# Patient Record
Sex: MALE | Race: WHITE | NOT HISPANIC OR LATINO | URBAN - METROPOLITAN AREA
[De-identification: names, ages, dates, MRNs, and addresses within clinical notes are randomized per-mention and may not be internally consistent; named-entity substitution may affect disease eponyms.]

---

## 2017-01-02 ENCOUNTER — OUTPATIENT (OUTPATIENT)
Dept: OUTPATIENT SERVICES | Facility: HOSPITAL | Age: 42
LOS: 1 days | Discharge: HOME | End: 2017-01-02

## 2017-06-27 DIAGNOSIS — I25.10 ATHEROSCLEROTIC HEART DISEASE OF NATIVE CORONARY ARTERY WITHOUT ANGINA PECTORIS: ICD-10-CM

## 2017-06-27 DIAGNOSIS — Z95.5 PRESENCE OF CORONARY ANGIOPLASTY IMPLANT AND GRAFT: ICD-10-CM

## 2017-06-27 DIAGNOSIS — I10 ESSENTIAL (PRIMARY) HYPERTENSION: ICD-10-CM

## 2017-06-27 DIAGNOSIS — E78.5 HYPERLIPIDEMIA, UNSPECIFIED: ICD-10-CM

## 2017-12-23 ENCOUNTER — OUTPATIENT (OUTPATIENT)
Dept: OUTPATIENT SERVICES | Facility: HOSPITAL | Age: 42
LOS: 1 days | Discharge: HOME | End: 2017-12-23

## 2017-12-23 DIAGNOSIS — I10 ESSENTIAL (PRIMARY) HYPERTENSION: ICD-10-CM

## 2017-12-23 DIAGNOSIS — R07.9 CHEST PAIN, UNSPECIFIED: ICD-10-CM

## 2017-12-23 DIAGNOSIS — E78.5 HYPERLIPIDEMIA, UNSPECIFIED: ICD-10-CM

## 2017-12-23 DIAGNOSIS — Z95.5 PRESENCE OF CORONARY ANGIOPLASTY IMPLANT AND GRAFT: ICD-10-CM

## 2017-12-23 DIAGNOSIS — I25.10 ATHEROSCLEROTIC HEART DISEASE OF NATIVE CORONARY ARTERY WITHOUT ANGINA PECTORIS: ICD-10-CM

## 2018-11-07 ENCOUNTER — INPATIENT (INPATIENT)
Facility: HOSPITAL | Age: 43
LOS: 1 days | Discharge: HOME | End: 2018-11-09
Attending: INTERNAL MEDICINE | Admitting: INTERNAL MEDICINE

## 2018-11-07 VITALS
HEART RATE: 68 BPM | DIASTOLIC BLOOD PRESSURE: 94 MMHG | OXYGEN SATURATION: 99 % | SYSTOLIC BLOOD PRESSURE: 160 MMHG | WEIGHT: 189.6 LBS | RESPIRATION RATE: 16 BRPM | TEMPERATURE: 98 F

## 2018-11-07 LAB
ALBUMIN SERPL ELPH-MCNC: 4.4 G/DL — SIGNIFICANT CHANGE UP (ref 3.5–5.2)
ALP SERPL-CCNC: 90 U/L — SIGNIFICANT CHANGE UP (ref 30–115)
ALT FLD-CCNC: 22 U/L — SIGNIFICANT CHANGE UP (ref 0–41)
ANION GAP SERPL CALC-SCNC: 15 MMOL/L — HIGH (ref 7–14)
APTT BLD: 30.2 SEC — SIGNIFICANT CHANGE UP (ref 27–39.2)
AST SERPL-CCNC: 30 U/L — SIGNIFICANT CHANGE UP (ref 0–41)
BASOPHILS # BLD AUTO: 0.07 K/UL — SIGNIFICANT CHANGE UP (ref 0–0.2)
BASOPHILS NFR BLD AUTO: 0.7 % — SIGNIFICANT CHANGE UP (ref 0–1)
BILIRUB DIRECT SERPL-MCNC: <0.2 MG/DL — SIGNIFICANT CHANGE UP (ref 0–0.2)
BILIRUB INDIRECT FLD-MCNC: >0.2 MG/DL — SIGNIFICANT CHANGE UP (ref 0.2–1.2)
BILIRUB SERPL-MCNC: 0.4 MG/DL — SIGNIFICANT CHANGE UP (ref 0.2–1.2)
BUN SERPL-MCNC: 20 MG/DL — SIGNIFICANT CHANGE UP (ref 10–20)
CALCIUM SERPL-MCNC: 9.8 MG/DL — SIGNIFICANT CHANGE UP (ref 8.5–10.1)
CHLORIDE SERPL-SCNC: 99 MMOL/L — SIGNIFICANT CHANGE UP (ref 98–110)
CO2 SERPL-SCNC: 25 MMOL/L — SIGNIFICANT CHANGE UP (ref 17–32)
CREAT SERPL-MCNC: 1.3 MG/DL — SIGNIFICANT CHANGE UP (ref 0.7–1.5)
EOSINOPHIL # BLD AUTO: 0.26 K/UL — SIGNIFICANT CHANGE UP (ref 0–0.7)
EOSINOPHIL NFR BLD AUTO: 2.5 % — SIGNIFICANT CHANGE UP (ref 0–8)
GLUCOSE BLDC GLUCOMTR-MCNC: 82 MG/DL — SIGNIFICANT CHANGE UP (ref 70–99)
GLUCOSE SERPL-MCNC: 113 MG/DL — HIGH (ref 70–99)
HCT VFR BLD CALC: 45 % — SIGNIFICANT CHANGE UP (ref 42–52)
HGB BLD-MCNC: 14.9 G/DL — SIGNIFICANT CHANGE UP (ref 14–18)
IMM GRANULOCYTES NFR BLD AUTO: 0.3 % — SIGNIFICANT CHANGE UP (ref 0.1–0.3)
INR BLD: 1.03 RATIO — SIGNIFICANT CHANGE UP (ref 0.65–1.3)
LACTATE SERPL-SCNC: 1.4 MMOL/L — SIGNIFICANT CHANGE UP (ref 0.5–2.2)
LIDOCAIN IGE QN: 22 U/L — SIGNIFICANT CHANGE UP (ref 7–60)
LYMPHOCYTES # BLD AUTO: 2.18 K/UL — SIGNIFICANT CHANGE UP (ref 1.2–3.4)
LYMPHOCYTES # BLD AUTO: 20.9 % — SIGNIFICANT CHANGE UP (ref 20.5–51.1)
MCHC RBC-ENTMCNC: 26.8 PG — LOW (ref 27–31)
MCHC RBC-ENTMCNC: 33.1 G/DL — SIGNIFICANT CHANGE UP (ref 32–37)
MCV RBC AUTO: 81.1 FL — SIGNIFICANT CHANGE UP (ref 80–94)
MONOCYTES # BLD AUTO: 0.7 K/UL — HIGH (ref 0.1–0.6)
MONOCYTES NFR BLD AUTO: 6.7 % — SIGNIFICANT CHANGE UP (ref 1.7–9.3)
NEUTROPHILS # BLD AUTO: 7.21 K/UL — HIGH (ref 1.4–6.5)
NEUTROPHILS NFR BLD AUTO: 68.9 % — SIGNIFICANT CHANGE UP (ref 42.2–75.2)
NRBC # BLD: 0 /100 WBCS — SIGNIFICANT CHANGE UP (ref 0–0)
NT-PROBNP SERPL-SCNC: 114 PG/ML — SIGNIFICANT CHANGE UP (ref 0–300)
PLATELET # BLD AUTO: 333 K/UL — SIGNIFICANT CHANGE UP (ref 130–400)
POTASSIUM SERPL-MCNC: 4.6 MMOL/L — SIGNIFICANT CHANGE UP (ref 3.5–5)
POTASSIUM SERPL-SCNC: 4.6 MMOL/L — SIGNIFICANT CHANGE UP (ref 3.5–5)
PROT SERPL-MCNC: 7.9 G/DL — SIGNIFICANT CHANGE UP (ref 6–8)
PROTHROM AB SERPL-ACNC: 11.8 SEC — SIGNIFICANT CHANGE UP (ref 9.95–12.87)
RBC # BLD: 5.55 M/UL — SIGNIFICANT CHANGE UP (ref 4.7–6.1)
RBC # FLD: 13.2 % — SIGNIFICANT CHANGE UP (ref 11.5–14.5)
SODIUM SERPL-SCNC: 139 MMOL/L — SIGNIFICANT CHANGE UP (ref 135–146)
TROPONIN T SERPL-MCNC: 0.1 NG/ML — CRITICAL HIGH
WBC # BLD: 10.45 K/UL — SIGNIFICANT CHANGE UP (ref 4.8–10.8)
WBC # FLD AUTO: 10.45 K/UL — SIGNIFICANT CHANGE UP (ref 4.8–10.8)

## 2018-11-07 RX ORDER — HEPARIN SODIUM 5000 [USP'U]/ML
INJECTION INTRAVENOUS; SUBCUTANEOUS
Qty: 25000 | Refills: 0 | Status: DISCONTINUED | OUTPATIENT
Start: 2018-11-07 | End: 2018-11-08

## 2018-11-07 RX ORDER — ASPIRIN/CALCIUM CARB/MAGNESIUM 324 MG
325 TABLET ORAL ONCE
Qty: 0 | Refills: 0 | Status: COMPLETED | OUTPATIENT
Start: 2018-11-07 | End: 2018-11-07

## 2018-11-07 RX ORDER — HEPARIN SODIUM 5000 [USP'U]/ML
5000 INJECTION INTRAVENOUS; SUBCUTANEOUS ONCE
Qty: 0 | Refills: 0 | Status: COMPLETED | OUTPATIENT
Start: 2018-11-07 | End: 2018-11-07

## 2018-11-07 RX ORDER — CLOPIDOGREL BISULFATE 75 MG/1
300 TABLET, FILM COATED ORAL ONCE
Qty: 0 | Refills: 0 | Status: COMPLETED | OUTPATIENT
Start: 2018-11-07 | End: 2018-11-07

## 2018-11-07 RX ADMIN — HEPARIN SODIUM 1000 UNIT(S)/HR: 5000 INJECTION INTRAVENOUS; SUBCUTANEOUS at 23:03

## 2018-11-07 RX ADMIN — CLOPIDOGREL BISULFATE 300 MILLIGRAM(S): 75 TABLET, FILM COATED ORAL at 21:40

## 2018-11-07 RX ADMIN — Medication 325 MILLIGRAM(S): at 19:47

## 2018-11-07 RX ADMIN — HEPARIN SODIUM 5000 UNIT(S): 5000 INJECTION INTRAVENOUS; SUBCUTANEOUS at 23:03

## 2018-11-07 NOTE — ED PROVIDER NOTE - ATTENDING CONTRIBUTION TO CARE
44 y/o male with h/o htn, hld, cad s/p RCA stent 7/2016, in ER with c/o CP. pt states had episode of CP last night, pressure to center of chest, lasted ~ 2-3 hours and then resolved.  TOday ~ 2 pm pain returned, felt much worse, pt became diaphoretic and nauseated, no vomiting, radiating into L arm.  Pt states pain was very bad for ~ 30 minutes and then started easing up.  mild pain currently.  no ha/dizziness/loc.  no palpitations.  no abd pain. no back pain.  no le pain/swelling. follows with [facility]anes[facility] for card, states had exercise stress test last yr that was 'ok'.    pe - nad, nc/at, eomi, perrl, op - clear, cta b/l, no w/r/r, rrr, abd- soft, nt/nd, nabs, from x 4, A&O x 3, no focal neuro deficits.  -cardiac w/u, to admit.

## 2018-11-07 NOTE — CONSULT NOTE ADULT - SUBJECTIVE AND OBJECTIVE BOX
Chief complaint:  Chest pain    HPI:  42 yo M h/o CAD s/p PCI to RCA/LAD 7/2016 came to ER c/o severe substernal burning chest pain yesterday and one epsiode today both lasting more then 1 hours, associated with nausea, diaphoresis, pain was 10/10 intensity this afternoon subsided but still there 2-3/10 intensity, on arrival to ER Vital stable, trop 0.1, EKG non speicifc T wave change.      ROS:  Constitutional: No fever, chill, sweats  Eye: No recent visual problem  ENMT: No ear pain, nasal congestion, throat pain  Respiratoty: No SOB, cough  Cardiovascular: + chest pain, palpitaion, syncope  Gastrointestinal: No nausea, vomitting, diarhea  Genitourinary: No dysuria, hematuria  Heam/Lymp: No brusing tendency, no swollen glands  Endocrine: Negative for excessive hunger, thirst  Musculoskeletal: No neck pain, back pain, joint pain  Intergumentory: No rash, skin lesions  Neurologic: alert and oriented    PAST MEDICAL & SURGICAL HISTORY  CAD  HTN    FAMILY HISTORY:  FAMILY HISTORY:  HTN mother    SOCIAL HISTORY:  Denies smoking, alcohol    ALLERGIES:  No Known Allergies    MEDICATIONS:  MEDICATIONS  (STANDING):  heparin  Infusion.  Unit(s)/Hr (10 mL/Hr) IV Continuous <Continuous>  heparin  Injectable 5000 Unit(s) IV Push once    MEDICATIONS  (PRN):      HOME MEDICATIONS:  Home Medications:      VITALS:   T(F): 97.9 (11-07 @ 16:46), Max: 97.9 (11-07 @ 16:46)  HR: 60 (11-07 @ 21:41) (60 - 68)  BP: 134/88 (11-07 @ 21:41) (134/88 - 160/94)  BP(mean): --  RR: 16 (11-07 @ 16:46) (16 - 16)  SpO2: 99% (11-07 @ 16:46) (99% - 99%)    I&O's Summary      PHYSICAL EXAM:  GEN: Alert and oriented X 3, Well nourished, No acute distress  NECK: Supple, non tender, NO JVD, No carotid bruit,   LUNGS: Clear to auscultation bilaterally, non labored respiration  CARDIOVASCULAR: S1/S2 present, RRR , no murmus or rubs, + PP bilaterally  ABD: Soft, non-tender, non-distended,   EXT: No Lower extremity edema, no tenderness  NEURO: Non focal  SKIN: Intact    LABS:                        14.9   10.45 )-----------( 333      ( 07 Nov 2018 19:00 )             45.0     11-07    139  |  99  |  20  ----------------------------<  113<H>  4.6   |  25  |  1.3    Ca    9.8      07 Nov 2018 19:00    TPro  7.9  /  Alb  4.4  /  TBili  0.4  /  DBili  <0.2  /  AST  30  /  ALT  22  /  AlkPhos  90  11-07    PTT - ( 07 Nov 2018 19:00 )  PTT:30.2 sec  Troponin T, Serum: 0.10 ng/mL <HH> (11-07-18 @ 19:00)  Lactate, Blood: 1.4 mmol/L (11-07-18 @ 19:00)    CARDIAC MARKERS ( 07 Nov 2018 19:00 )  x     / 0.10 ng/mL / x     / x     / x        Serum Pro-Brain Natriuretic Peptide: 114 pg/mL (11-07-18 @ 19:00)      RADIOLOGY:  -CXR:      -TTE:  -CCTA:  -STRESS TEST:  -CATHETERIZATION: PCI to Prox and diatl RCA, Prox LAD 7/2016    ECG:  NSR, t wave inversion v4-v6    TELEMETRY EVENTS: Chief complaint:  Chest pain    HPI:  42 yo M h/o CAD s/p PCI to RCA/LAD 7/2016 came to ER c/o severe substernal burning chest pain yesterday and one epsiode today both lasting more then 1 hours, associated with nausea, diaphoresis, pain was 10/10 intensity this afternoon subsided but still there 2-3/10 intensity, on arrival to ER Vital stable, trop 0.1, EKG non speicifc T wave change.      ROS:  Constitutional: No fever, chill, sweats  Eye: No recent visual problem  ENMT: No ear pain, nasal congestion, throat pain  Respiratoty: No SOB, cough  Cardiovascular: + chest pain, palpitaion, syncope  Gastrointestinal: No nausea, vomitting, diarhea  Genitourinary: No dysuria, hematuria  Heam/Lymp: No brusing tendency, no swollen glands  Endocrine: Negative for excessive hunger, thirst  Musculoskeletal: No neck pain, back pain, joint pain  Intergumentory: No rash, skin lesions  Neurologic: alert and oriented    PAST MEDICAL & SURGICAL HISTORY  CAD  HTN    FAMILY HISTORY:  FAMILY HISTORY:  HTN mother    SOCIAL HISTORY:  Denies smoking, alcohol    ALLERGIES:  No Known Allergies    MEDICATIONS:  MEDICATIONS  (STANDING):  heparin  Infusion.  Unit(s)/Hr (10 mL/Hr) IV Continuous <Continuous>  heparin  Injectable 5000 Unit(s) IV Push once    MEDICATIONS  (PRN):      HOME MEDICATIONS:  Home Medications:      VITALS:   T(F): 97.9 (11-07 @ 16:46), Max: 97.9 (11-07 @ 16:46)  HR: 60 (11-07 @ 21:41) (60 - 68)  BP: 134/88 (11-07 @ 21:41) (134/88 - 160/94)  BP(mean): --  RR: 16 (11-07 @ 16:46) (16 - 16)  SpO2: 99% (11-07 @ 16:46) (99% - 99%)    I&O's Summary      PHYSICAL EXAM:  GEN: Alert and oriented X 3, Well nourished, No acute distress  NECK: Supple, non tender, NO JVD, No carotid bruit,   LUNGS: Clear to auscultation bilaterally, non labored respiration  CARDIOVASCULAR: S1/S2 present, RRR , no murmus or rubs, + PP bilaterally  ABD: Soft, non-tender, non-distended,   EXT/MS: No Lower extremity edema, no tenderness  NEURO: Non focal  SKIN: Intact    LABS:                        14.9   10.45 )-----------( 333      ( 07 Nov 2018 19:00 )             45.0     11-07    139  |  99  |  20  ----------------------------<  113<H>  4.6   |  25  |  1.3    Ca    9.8      07 Nov 2018 19:00    TPro  7.9  /  Alb  4.4  /  TBili  0.4  /  DBili  <0.2  /  AST  30  /  ALT  22  /  AlkPhos  90  11-07    PTT - ( 07 Nov 2018 19:00 )  PTT:30.2 sec  Troponin T, Serum: 0.10 ng/mL <HH> (11-07-18 @ 19:00)  Lactate, Blood: 1.4 mmol/L (11-07-18 @ 19:00)    CARDIAC MARKERS ( 07 Nov 2018 19:00 )  x     / 0.10 ng/mL / x     / x     / x        Serum Pro-Brain Natriuretic Peptide: 114 pg/mL (11-07-18 @ 19:00)      RADIOLOGY:  -CXR:      -TTE:  -CCTA:  -STRESS TEST:  -CATHETERIZATION: PCI to Prox and diatl RCA, Prox LAD 7/2016    ECG:  NSR, t wave inversion v4-v6    TELEMETRY EVENTS:

## 2018-11-07 NOTE — ED PROVIDER NOTE - MEDICAL DECISION MAKING DETAILS
42 y/o male with h/o CAD, s/p stents, in ER with c/o CP last night and then again today.  ekg with new twi v4-v6.  trop 0.1.  pt seen and eval by card fellow, Dr. Lopez, pt given asa, plavix, heparin drip and admitted to CCU for continued evaluation and management.

## 2018-11-07 NOTE — CONSULT NOTE ADULT - ASSESSMENT
44 yo M with typical chest pain    NSTEMI/UA  - DAPT/lipitor  - heparin gtt  - 2d echo  - lipid profile/HbA1c  - serial cardiac enzymes and ekgs  - CCU monitoring  - lipitor   - if patient develops worsening pain, start nitro drip   - NPO after midnight for possible cath in AM  - will d/w attending 44 yo M with typical chest pain    NSTEMI/UA  - DAPT/lipitor  - heparin gtt  - 2d echo  - lipid profile/HbA1c  - serial cardiac enzymes and ekgs  - CCU monitoring  - lipitor   - if patient develops worsening pain, start nitro drip   - NPO after midnight for possible cath in AM

## 2018-11-08 LAB
ANION GAP SERPL CALC-SCNC: 13 MMOL/L — SIGNIFICANT CHANGE UP (ref 7–14)
APTT BLD: 49.4 SEC — HIGH (ref 27–39.2)
BLD GP AB SCN SERPL QL: SIGNIFICANT CHANGE UP
BUN SERPL-MCNC: 17 MG/DL — SIGNIFICANT CHANGE UP (ref 10–20)
CALCIUM SERPL-MCNC: 9.7 MG/DL — SIGNIFICANT CHANGE UP (ref 8.5–10.1)
CHLORIDE SERPL-SCNC: 101 MMOL/L — SIGNIFICANT CHANGE UP (ref 98–110)
CHOLEST SERPL-MCNC: 144 MG/DL — SIGNIFICANT CHANGE UP (ref 100–200)
CK MB CFR SERPL CALC: 42.8 NG/ML — HIGH (ref 0.6–6.3)
CO2 SERPL-SCNC: 24 MMOL/L — SIGNIFICANT CHANGE UP (ref 17–32)
CREAT SERPL-MCNC: 1.2 MG/DL — SIGNIFICANT CHANGE UP (ref 0.7–1.5)
ESTIMATED AVERAGE GLUCOSE: 114 MG/DL — SIGNIFICANT CHANGE UP (ref 68–114)
GLUCOSE SERPL-MCNC: 98 MG/DL — SIGNIFICANT CHANGE UP (ref 70–99)
HBA1C BLD-MCNC: 5.6 % — SIGNIFICANT CHANGE UP (ref 4–5.6)
HCT VFR BLD CALC: 45.1 % — SIGNIFICANT CHANGE UP (ref 42–52)
HDLC SERPL-MCNC: 33 MG/DL — LOW
HGB BLD-MCNC: 15.1 G/DL — SIGNIFICANT CHANGE UP (ref 14–18)
LIPID PNL WITH DIRECT LDL SERPL: 99 MG/DL — SIGNIFICANT CHANGE UP (ref 4–129)
MAGNESIUM SERPL-MCNC: 2.1 MG/DL — SIGNIFICANT CHANGE UP (ref 1.8–2.4)
MCHC RBC-ENTMCNC: 27.2 PG — SIGNIFICANT CHANGE UP (ref 27–31)
MCHC RBC-ENTMCNC: 33.5 G/DL — SIGNIFICANT CHANGE UP (ref 32–37)
MCV RBC AUTO: 81.1 FL — SIGNIFICANT CHANGE UP (ref 80–94)
NRBC # BLD: 0 /100 WBCS — SIGNIFICANT CHANGE UP (ref 0–0)
NT-PROBNP SERPL-SCNC: 390 PG/ML — HIGH (ref 0–300)
PLATELET # BLD AUTO: 303 K/UL — SIGNIFICANT CHANGE UP (ref 130–400)
POTASSIUM SERPL-MCNC: 4.2 MMOL/L — SIGNIFICANT CHANGE UP (ref 3.5–5)
POTASSIUM SERPL-SCNC: 4.2 MMOL/L — SIGNIFICANT CHANGE UP (ref 3.5–5)
RBC # BLD: 5.56 M/UL — SIGNIFICANT CHANGE UP (ref 4.7–6.1)
RBC # FLD: 13.3 % — SIGNIFICANT CHANGE UP (ref 11.5–14.5)
SODIUM SERPL-SCNC: 138 MMOL/L — SIGNIFICANT CHANGE UP (ref 135–146)
TOTAL CHOLESTEROL/HDL RATIO MEASUREMENT: 4.4 RATIO — SIGNIFICANT CHANGE UP (ref 4–5.5)
TRIGL SERPL-MCNC: 113 MG/DL — SIGNIFICANT CHANGE UP (ref 10–149)
TROPONIN T SERPL-MCNC: 0.71 NG/ML — CRITICAL HIGH
TYPE + AB SCN PNL BLD: SIGNIFICANT CHANGE UP
WBC # BLD: 12.24 K/UL — HIGH (ref 4.8–10.8)
WBC # FLD AUTO: 12.24 K/UL — HIGH (ref 4.8–10.8)

## 2018-11-08 RX ORDER — SODIUM CHLORIDE 9 MG/ML
1000 INJECTION INTRAMUSCULAR; INTRAVENOUS; SUBCUTANEOUS
Qty: 0 | Refills: 0 | Status: DISCONTINUED | OUTPATIENT
Start: 2018-11-08 | End: 2018-11-09

## 2018-11-08 RX ORDER — CLOPIDOGREL BISULFATE 75 MG/1
75 TABLET, FILM COATED ORAL DAILY
Qty: 0 | Refills: 0 | Status: DISCONTINUED | OUTPATIENT
Start: 2018-11-08 | End: 2018-11-08

## 2018-11-08 RX ORDER — ATORVASTATIN CALCIUM 80 MG/1
80 TABLET, FILM COATED ORAL DAILY
Qty: 0 | Refills: 0 | Status: DISCONTINUED | OUTPATIENT
Start: 2018-11-08 | End: 2018-11-09

## 2018-11-08 RX ORDER — PRASUGREL 5 MG/1
1 TABLET, FILM COATED ORAL
Qty: 30 | Refills: 0 | OUTPATIENT
Start: 2018-11-08 | End: 2018-12-07

## 2018-11-08 RX ORDER — CHLORHEXIDINE GLUCONATE 213 G/1000ML
1 SOLUTION TOPICAL
Qty: 0 | Refills: 0 | Status: DISCONTINUED | OUTPATIENT
Start: 2018-11-08 | End: 2018-11-09

## 2018-11-08 RX ORDER — NITROGLYCERIN 6.5 MG
30 CAPSULE, EXTENDED RELEASE ORAL
Qty: 50 | Refills: 0 | Status: DISCONTINUED | OUTPATIENT
Start: 2018-11-08 | End: 2018-11-09

## 2018-11-08 RX ORDER — AMLODIPINE BESYLATE 2.5 MG/1
10 TABLET ORAL DAILY
Qty: 0 | Refills: 0 | Status: DISCONTINUED | OUTPATIENT
Start: 2018-11-08 | End: 2018-11-09

## 2018-11-08 RX ORDER — ASPIRIN/CALCIUM CARB/MAGNESIUM 324 MG
1 TABLET ORAL
Qty: 0 | Refills: 0 | COMMUNITY

## 2018-11-08 RX ORDER — AMLODIPINE BESYLATE 2.5 MG/1
1 TABLET ORAL
Qty: 0 | Refills: 0 | COMMUNITY

## 2018-11-08 RX ORDER — METOPROLOL TARTRATE 50 MG
50 TABLET ORAL DAILY
Qty: 0 | Refills: 0 | Status: DISCONTINUED | OUTPATIENT
Start: 2018-11-08 | End: 2018-11-09

## 2018-11-08 RX ORDER — PRASUGREL 5 MG/1
30 TABLET, FILM COATED ORAL ONCE
Qty: 0 | Refills: 0 | Status: COMPLETED | OUTPATIENT
Start: 2018-11-08 | End: 2018-11-08

## 2018-11-08 RX ORDER — METOPROLOL TARTRATE 50 MG
1 TABLET ORAL
Qty: 0 | Refills: 0 | COMMUNITY

## 2018-11-08 RX ORDER — HEPARIN SODIUM 5000 [USP'U]/ML
1000 INJECTION INTRAVENOUS; SUBCUTANEOUS
Qty: 25000 | Refills: 0 | Status: DISCONTINUED | OUTPATIENT
Start: 2018-11-08 | End: 2018-11-08

## 2018-11-08 RX ORDER — ASPIRIN/CALCIUM CARB/MAGNESIUM 324 MG
81 TABLET ORAL DAILY
Qty: 0 | Refills: 0 | Status: DISCONTINUED | OUTPATIENT
Start: 2018-11-08 | End: 2018-11-09

## 2018-11-08 RX ORDER — HEPARIN SODIUM 5000 [USP'U]/ML
1200 INJECTION INTRAVENOUS; SUBCUTANEOUS
Qty: 25000 | Refills: 0 | Status: DISCONTINUED | OUTPATIENT
Start: 2018-11-08 | End: 2018-11-08

## 2018-11-08 RX ORDER — ACETAMINOPHEN 500 MG
650 TABLET ORAL EVERY 6 HOURS
Qty: 0 | Refills: 0 | Status: DISCONTINUED | OUTPATIENT
Start: 2018-11-08 | End: 2018-11-09

## 2018-11-08 RX ORDER — PRASUGREL 5 MG/1
10 TABLET, FILM COATED ORAL DAILY
Qty: 0 | Refills: 0 | Status: DISCONTINUED | OUTPATIENT
Start: 2018-11-09 | End: 2018-11-09

## 2018-11-08 RX ADMIN — HEPARIN SODIUM 10 UNIT(S)/HR: 5000 INJECTION INTRAVENOUS; SUBCUTANEOUS at 01:51

## 2018-11-08 RX ADMIN — ATORVASTATIN CALCIUM 80 MILLIGRAM(S): 80 TABLET, FILM COATED ORAL at 00:21

## 2018-11-08 RX ADMIN — PRASUGREL 30 MILLIGRAM(S): 5 TABLET, FILM COATED ORAL at 15:05

## 2018-11-08 RX ADMIN — HEPARIN SODIUM 12 UNIT(S)/HR: 5000 INJECTION INTRAVENOUS; SUBCUTANEOUS at 07:03

## 2018-11-08 RX ADMIN — AMLODIPINE BESYLATE 10 MILLIGRAM(S): 2.5 TABLET ORAL at 06:41

## 2018-11-08 RX ADMIN — Medication 650 MILLIGRAM(S): at 17:28

## 2018-11-08 RX ADMIN — Medication 5 MILLIGRAM(S): at 06:41

## 2018-11-08 RX ADMIN — Medication 50 MILLIGRAM(S): at 06:41

## 2018-11-08 RX ADMIN — CLOPIDOGREL BISULFATE 75 MILLIGRAM(S): 75 TABLET, FILM COATED ORAL at 10:41

## 2018-11-08 RX ADMIN — Medication 650 MILLIGRAM(S): at 18:00

## 2018-11-08 RX ADMIN — Medication 81 MILLIGRAM(S): at 10:41

## 2018-11-08 RX ADMIN — Medication 9 MICROGRAM(S)/MIN: at 19:20

## 2018-11-08 NOTE — PROVIDER CONTACT NOTE (OTHER) - SITUATION
pt c/o slight chest pain. pt is on nitro gtt @8 cc/hr. No relief with the gtt at moment. MD made aware.

## 2018-11-08 NOTE — H&P ADULT - ASSESSMENT
44 yo M with PMH of CAD s/p PCI, previous MI, HTN, DLD presented to ED with complaining of severe, substernal burning chest pain. Patient had 1 episode yesterday and another today, both lasting more than 1 hour, associated with nausea, diaphoresis, 10/10 pain at its worst.    #) NSTEMI  - trops = 0.1  - EKG = nonspecific changes  - Cardio following - Rx DAPT/lipitor, Heparin gtt, 2d echo, a1c, lipids    #) CAD s/p PCI - c/w DAPT + statin    #) HTN - stable    #) DLD - c/w statin    DVT ppx: Heparin drip  Diet: NPO for cath in AM  Activity: bedrest  Code status: FULL  Dispo: pending 44 yo M with PMH of CAD s/p PCI, previous MI, HTN, DLD presented to ED with complaining of severe, substernal burning chest pain. Patient had 1 episode yesterday and another today, both lasting more than 1 hour, associated with nausea, diaphoresis, 10/10 pain at its worst.    #) NSTEMI/UA  - trops = 0.1  - EKG = nonspecific changes  - Cardio following - Rx DAPT/lipitor, Heparin gtt, 2d echo, a1c, lipids, CCU monitoring    #) CAD s/p PCI - c/w DAPT + statin + BB    #) HTN - stable, c/w Norvasc + Enalapril + BB    #) DLD - c/w statin    DVT ppx: Heparin drip  Diet: NPO for possible cath in AM  Activity: bedrest  Code status: FULL  Dispo: pending

## 2018-11-08 NOTE — PROVIDER CONTACT NOTE (OTHER) - BACKGROUND
Pt s/p cardiac cath via right radial with stent placed (mid RCA). Pt currently on nitro gtt with c/o of slight discomfort in chest. Pt vitals stable, blood pressure within normal limits.

## 2018-11-08 NOTE — H&P ADULT - NSHPPHYSICALEXAM_GEN_ALL_CORE
GEN: NAD, comfortable  CARDIO: RRR, no m/r/g  RESP: CTAB, no w/r/r  ABD: soft, NT/ND, +BS  EXT: no edema, pp b/l  NEURO: AAOx3, grossly normal

## 2018-11-08 NOTE — CHART NOTE - NSCHARTNOTEFT_GEN_A_CORE
PREOPERATIVE DAY OF PROCEDURE EVALUATION:  I have personally seen and examined the patient.  I agree with the history and physical which I have reviewed and noted any changes below.  (Signed electronically by __________)  11-08-18 @ 11:33

## 2018-11-08 NOTE — CHART NOTE - NSCHARTNOTEFT_GEN_A_CORE
PRE-OP DIAGNOSIS: suspected CAD    PROCEDURE: [facility] with coronary angiography    Physician: tabitha  Assistant: Inna monte    ANESTHESIA TYPE:  [  ]General Anesthesia  [x  ] Sedation  [  x] Local/Regional    ESTIMATED BLOOD LOSS:    10   mL    CONDITION  [  ] Critical  [  ] Serious  [  ]Fair  [x  ]Good      SPECIMENS REMOVED (IF APPLICABLE):      IV CONTRAST:           166  mL      IMPLANTS (IF APPLICABLE)      FINDINGS    Left Heart Catheterization:  LVEF%: 60  LVEDP: NL  [ ] Normal Coronary Arteries  [ ] Luminal Irregularities  [ ] Non-obstructive CAD      LEFT HEART CATHETERIZATION                                    Left main NL    LAD:         patent mid stent               Diag:    Left Circumflex: minor luminal irregularities  OM:    Right Coronary Artery: 99% mid lesion, between patent mid and distal RCA stents  RPDA  RPL    Ramus Intermed:    INTERVENTION  IMPLANTS: SUNG 3.0 x 24 synergy        POST-OP DIAGNOSIS          PLAN OF CARE  [ ] D/C Home today  [ ]  D/C in AM  [x ] Return to In-patient bed  [ ] Admit for observation  [ ] Return for staged procedure:  [ ] CT Surgery consult called  [x ]  Continue DAPT, B-blocker & Statin therapy PRE-OP DIAGNOSIS: suspected CAD    PROCEDURE: [facility] with coronary angiography    Physician: tabitha  Assistant: Inna monte    ANESTHESIA TYPE:  [  ]General Anesthesia  [x  ] Sedation  [  x] Local/Regional    ESTIMATED BLOOD LOSS:    10   mL    CONDITION  [  ] Critical  [  ] Serious  [  ]Fair  [x  ]Good      SPECIMENS REMOVED (IF APPLICABLE):      IV CONTRAST:           166  mL      IMPLANTS (IF APPLICABLE)      FINDINGS    Left Heart Catheterization:  LVEF%: 60  LVEDP: NL  [ ] Normal Coronary Arteries  [ ] Luminal Irregularities  [ ] Non-obstructive CAD      LEFT HEART CATHETERIZATION                                    Left main NL    LAD:         patent mid stent               Diag:    Left Circumflex: minor luminal irregularities  OM:    Right Coronary Artery: 99% distal lesion, between patent mid and distal RCA stents  RPDA  RPL    Ramus Intermed:    INTERVENTION  IMPLANTS: SUNG 3.0 x 24 synergy        POST-OP DIAGNOSIS          PLAN OF CARE  [ ] D/C Home today  [ ]  D/C in AM  [x ] Return to In-patient bed  [ ] Admit for observation  [ ] Return for staged procedure:  [ ] CT Surgery consult called  [x ]  Continue DAPT, B-blocker & Statin therapy

## 2018-11-08 NOTE — PROVIDER CONTACT NOTE (OTHER) - ACTION/TREATMENT ORDERED:
MD ordered for stat 12 lead EKG, cardiac enzymes, and nitro drip was increased to 10 cc/hr by charge nurse rivas. (monitoring drip for float nurse)

## 2018-11-08 NOTE — H&P ADULT - FAMILY HISTORY
No pertinent family history in first degree relatives Mother  Still living? Unknown  Family history of hypertension, Age at diagnosis: Age Unknown  Family history of seizures, Age at diagnosis: Age Unknown     Aunt  Still living? Unknown  Family history of hypertension, Age at diagnosis: Age Unknown

## 2018-11-08 NOTE — H&P ADULT - HISTORY OF PRESENT ILLNESS
44 yo M with PMH of CAD s/p PCI, previous MI, HTN, DLD presented to ED with c/o "I have left sided chest pressure that started yesterday and resolved. At 1 pm today I had stabbing left sided CP with left arm numbness and sweating. Pain is better now." Denies any SOB, cough, fever, chills, weakness.    In ED 42 yo M with PMH of CAD s/p PCI, previous MI, HTN, DLD presented to ED with complaining of substernal, severe burning chest pa "I have left sided chest pressure that started yesterday and resolved. At 1 pm today I had stabbing left sided CP with left arm numbness and sweating. Pain is better now." Denies any SOB, cough, fever, chills, weakness.    severe substernal burning chest pain yesterday and one epsiode today both lasting more then 1 hours, associated with nausea, diaphoresis, pain was 10/10 intensity this afternoon subsided but still there 2-3/10 intensity, on arrival t    In ED 42 yo M with PMH of CAD s/p PCI, previous MI, HTN, DLD presented to ED with complaining of severe, substernal burning chest pain. Patient had 1 episode yesterday and another today, both lasting more than 1 hour, associated with nausea, diaphoresis, 10/10 pain at its worst. Pain improved upon arrival to ED but still some discomfort. Denies any cough, fevers, chills abdominal pain, or other complaint or symptom.    In ED trops were 0.1, nonspecific EKG changes, was evaluated by Cardio who recommended treating patient for NSTEMI. 44 yo M with PMH of CAD s/p PCI (RCA/LAD), HTN, DLD presented to ED complaining of severe substernal burning chest pain. Patient had 1 episode yesterday and another today, both lasting more than 1 hour, associated with nausea, diaphoresis, 10/10 pain at its worst. Pain improved upon arrival to ED but still reports some discomfort. Denies any cough, fevers, chills, abdominal pain, headaches, myalgias, or other complaint or symptom.    In ED trops were 0.1, nonspecific EKG changes, was evaluated by Cardio who recommended treating patient for NSTEMI.

## 2018-11-08 NOTE — H&P ADULT - NSHPSOCIALHISTORY_GEN_ALL_CORE
denies current tobacco, alcohol, or illicit drug use  history of smoking - quit 10 years ago, smoked <1 PPD x 5-6 years

## 2018-11-08 NOTE — H&P ADULT - ATTENDING COMMENTS
44 yo M with PMH of CAD s/p PCI (RCA/LAD), HTN, DLD presented to ED complaining of severe substernal burning chest pain. Patient had 1 episode yesterday and another today, both lasting more than 1 hour, associated with nausea, diaphoresis, 10/10 pain at its worst. Pain improved upon arrival to ED but still reports some discomfort. Denies any cough, fevers, chills, abdominal pain, headaches, myalgias, or other complaint or symptom.  In ED trops were 0.1, nonspecific EKG changes, was evaluated by Cardio who recommended treating patient for NSTEMI.    REVIEW OF SYSTEMS: see cc/HPI  CONSTITUTIONAL: No weakness, fevers or chills  EYES/ENT: No visual changes;  No vertigo or throat pain   NECK: No pain or stiffness  RESPIRATORY: No cough, wheezing, hemoptysis; No shortness of breath  CARDIOVASCULAR: (+) chest pain or palpitations  GASTROINTESTINAL: No abdominal or epigastric pain. No nausea, vomiting, or hematemesis; No diarrhea or constipation. No melena or hematochezia.  GENITOURINARY: No dysuria, frequency or hematuria  NEUROLOGICAL: No numbness or weakness  SKIN: No itching, rashes    Physical Exam:  General: WN/WD NAD  Neurology: A&Ox3, nonfocal, follows commands  Eyes: PERRLA/ EOMI  ENT/Neck: Neck supple, trachea midline, No JVD  Respiratory: CTA B/L, No wheezing, rales, rhonchi  CV: Normal rate regular rhythm, S1S2, no murmurs, rubs or gallops  Abdominal: Soft, NT, ND +BS,   Extremities: No edema, + peripheral pulses  Skin: No Rashes, Hematoma, Ecchymosis  Incisions:   Tubes:    A/P  NSTEMI in patient w/ h/o CAD/PCI/stent ( on DAPT)  -admit to CCU  -ASA/Plavix/IV heparin/ BBlocker/ Statin   -PTT monitoring of heparin   -serial EKG /Debbie  -2d Echo  -Cardiology eval    HTN / Dyslipidemia   -as above     NPO for cath in the AM

## 2018-11-09 ENCOUNTER — TRANSCRIPTION ENCOUNTER (OUTPATIENT)
Age: 43
End: 2018-11-09

## 2018-11-09 VITALS — HEART RATE: 70 BPM | DIASTOLIC BLOOD PRESSURE: 86 MMHG | SYSTOLIC BLOOD PRESSURE: 143 MMHG

## 2018-11-09 LAB
ANION GAP SERPL CALC-SCNC: 15 MMOL/L — HIGH (ref 7–14)
APTT BLD: 31.3 SEC — SIGNIFICANT CHANGE UP (ref 27–39.2)
APTT BLD: 32.3 SEC — SIGNIFICANT CHANGE UP (ref 27–39.2)
BASOPHILS # BLD AUTO: 0.09 K/UL — SIGNIFICANT CHANGE UP (ref 0–0.2)
BASOPHILS NFR BLD AUTO: 0.7 % — SIGNIFICANT CHANGE UP (ref 0–1)
BUN SERPL-MCNC: 19 MG/DL — SIGNIFICANT CHANGE UP (ref 10–20)
CALCIUM SERPL-MCNC: 9.5 MG/DL — SIGNIFICANT CHANGE UP (ref 8.5–10.1)
CHLORIDE SERPL-SCNC: 99 MMOL/L — SIGNIFICANT CHANGE UP (ref 98–110)
CK MB CFR SERPL CALC: 47.8 NG/ML — HIGH (ref 0.6–6.3)
CK MB CFR SERPL CALC: 78.1 NG/ML — HIGH (ref 0.6–6.3)
CK SERPL-CCNC: 726 U/L — HIGH (ref 0–225)
CO2 SERPL-SCNC: 23 MMOL/L — SIGNIFICANT CHANGE UP (ref 17–32)
CREAT SERPL-MCNC: 1.2 MG/DL — SIGNIFICANT CHANGE UP (ref 0.7–1.5)
EOSINOPHIL # BLD AUTO: 0.38 K/UL — SIGNIFICANT CHANGE UP (ref 0–0.7)
EOSINOPHIL NFR BLD AUTO: 2.8 % — SIGNIFICANT CHANGE UP (ref 0–8)
GLUCOSE SERPL-MCNC: 103 MG/DL — HIGH (ref 70–99)
HCT VFR BLD CALC: 46 % — SIGNIFICANT CHANGE UP (ref 42–52)
HGB BLD-MCNC: 15.5 G/DL — SIGNIFICANT CHANGE UP (ref 14–18)
IMM GRANULOCYTES NFR BLD AUTO: 0.3 % — SIGNIFICANT CHANGE UP (ref 0.1–0.3)
LYMPHOCYTES # BLD AUTO: 1.69 K/UL — SIGNIFICANT CHANGE UP (ref 1.2–3.4)
LYMPHOCYTES # BLD AUTO: 12.4 % — LOW (ref 20.5–51.1)
MAGNESIUM SERPL-MCNC: 1.9 MG/DL — SIGNIFICANT CHANGE UP (ref 1.8–2.4)
MCHC RBC-ENTMCNC: 27.2 PG — SIGNIFICANT CHANGE UP (ref 27–31)
MCHC RBC-ENTMCNC: 33.7 G/DL — SIGNIFICANT CHANGE UP (ref 32–37)
MCV RBC AUTO: 80.7 FL — SIGNIFICANT CHANGE UP (ref 80–94)
MONOCYTES # BLD AUTO: 1.21 K/UL — HIGH (ref 0.1–0.6)
MONOCYTES NFR BLD AUTO: 8.9 % — SIGNIFICANT CHANGE UP (ref 1.7–9.3)
NEUTROPHILS # BLD AUTO: 10.21 K/UL — HIGH (ref 1.4–6.5)
NEUTROPHILS NFR BLD AUTO: 74.9 % — SIGNIFICANT CHANGE UP (ref 42.2–75.2)
NRBC # BLD: 0 /100 WBCS — SIGNIFICANT CHANGE UP (ref 0–0)
PHOSPHATE SERPL-MCNC: 3.3 MG/DL — SIGNIFICANT CHANGE UP (ref 2.1–4.9)
PLATELET # BLD AUTO: 304 K/UL — SIGNIFICANT CHANGE UP (ref 130–400)
POTASSIUM SERPL-MCNC: 4.2 MMOL/L — SIGNIFICANT CHANGE UP (ref 3.5–5)
POTASSIUM SERPL-SCNC: 4.2 MMOL/L — SIGNIFICANT CHANGE UP (ref 3.5–5)
RBC # BLD: 5.7 M/UL — SIGNIFICANT CHANGE UP (ref 4.7–6.1)
RBC # FLD: 13.1 % — SIGNIFICANT CHANGE UP (ref 11.5–14.5)
SODIUM SERPL-SCNC: 137 MMOL/L — SIGNIFICANT CHANGE UP (ref 135–146)
TROPONIN T SERPL-MCNC: 0.9 NG/ML — CRITICAL HIGH
TROPONIN T SERPL-MCNC: 0.96 NG/ML — CRITICAL HIGH
WBC # BLD: 13.62 K/UL — HIGH (ref 4.8–10.8)
WBC # FLD AUTO: 13.62 K/UL — HIGH (ref 4.8–10.8)

## 2018-11-09 RX ORDER — ATORVASTATIN CALCIUM 80 MG/1
1 TABLET, FILM COATED ORAL
Qty: 0 | Refills: 0 | COMMUNITY

## 2018-11-09 RX ORDER — ATORVASTATIN CALCIUM 80 MG/1
1 TABLET, FILM COATED ORAL
Qty: 0 | Refills: 0 | COMMUNITY
Start: 2018-11-09

## 2018-11-09 RX ADMIN — Medication 50 MILLIGRAM(S): at 05:11

## 2018-11-09 RX ADMIN — Medication 81 MILLIGRAM(S): at 12:40

## 2018-11-09 RX ADMIN — Medication 5 MILLIGRAM(S): at 12:45

## 2018-11-09 RX ADMIN — PRASUGREL 10 MILLIGRAM(S): 5 TABLET, FILM COATED ORAL at 12:41

## 2018-11-09 RX ADMIN — Medication 5 MILLIGRAM(S): at 05:09

## 2018-11-09 RX ADMIN — AMLODIPINE BESYLATE 10 MILLIGRAM(S): 2.5 TABLET ORAL at 05:09

## 2018-11-09 NOTE — PROGRESS NOTE ADULT - SUBJECTIVE AND OBJECTIVE BOX
PROGRESS NOTE  Chief Complaint:  Patient is a 43y old  Male who presents with a chief complaint of NSTEMI (09 Nov 2018 08:26)      HPI:  42 yo M with PMH of CAD s/p PCI (RCA/LAD), HTN, DLD presented to ED complaining of severe substernal burning chest pain. Patient had 1 episode yesterday and another today, both lasting more than 1 hour, associated with nausea, diaphoresis, 10/10 pain at its worst. Pain improved upon arrival to ED but still reports some discomfort. Denies any cough, fevers, chills, abdominal pain, headaches, myalgias, or other complaint or symptom.    In ED trops were 0.1, nonspecific EKG changes, was evaluated by Cardio who recommended treating patient for NSTEMI. (08 Nov 2018 01:07)      ALLERGIES:  No Known Allergies      HOSPITAL MEDICATIONS:  MEDICATIONS  (STANDING):  amLODIPine   Tablet 10 milliGRAM(s) Oral daily  aspirin  chewable 81 milliGRAM(s) Oral daily  atorvastatin 80 milliGRAM(s) Oral daily  chlorhexidine 4% Liquid 1 Application(s) Topical <User Schedule>  enalapril 10 milliGRAM(s) Oral daily  enalapril 5 milliGRAM(s) Oral once  metoprolol succinate ER 50 milliGRAM(s) Oral daily  prasugrel 10 milliGRAM(s) Oral daily  sodium chloride 0.9%. 1000 milliLiter(s) (100 mL/Hr) IV Continuous <Continuous>  sodium chloride 0.9%. 1000 milliLiter(s) (100 mL/Hr) IV Continuous <Continuous>    MEDICATIONS  (PRN):  acetaminophen   Tablet .. 650 milliGRAM(s) Oral every 6 hours PRN Moderate Pain (4 - 6)      PMHX/PSHX:  CAD (coronary artery disease)  HLD (hyperlipidemia)  HTN (hypertension)  No significant past surgical history      FAMILY HISTORY:  Family history of seizures (Mother)  Family history of hypertension (Mother, Aunt)  No pertinent family history in first degree relatives      SOCIAL HISTORY:    REVIEW OF SYSTEMS:     General:  No wt loss, fevers, chills, night sweats, fatigue,   Eyes:  Good vision, no reported pain  ENT:  No sore throat, pain, runny nose, dysphagia  CV:  No pain, palpitations, hypo/hypertension  Resp:  No dyspnea, cough, tachypnea, wheezing  GI:  No pain, No nausea, No vomiting, No diarrhea, No constipation, No weight loss, No fever, No pruritis, No rectal bleeding, No tarry stools, No dysphagia,  :  No pain, bleeding, incontinence, nocturia  Muscle:  No pain, weakness  Neuro:  No weakness, tingling, memory problems  Psych:  No fatigue, insomnia, mood problems, depression  Endocrine:  No polyuria, polydipsia, cold/heat intolerance  Heme:  No petechiae, ecchymosis, easy bruisability  Skin:  No rash, tattoos, scars, edema      PHYSICAL EXAM:   Vital Signs:  Vital Signs Last 24 Hrs  T(C): 36.4 (09 Nov 2018 08:10), Max: 36.7 (09 Nov 2018 00:00)  T(F): 97.6 (09 Nov 2018 08:10), Max: 98 (09 Nov 2018 00:00)  HR: 68 (09 Nov 2018 12:25) (54 - 92)  BP: 148/105 (09 Nov 2018 12:25) (108/64 - 167/106)  BP(mean): 119 (09 Nov 2018 12:25) (85 - 129)  RR: 20 (09 Nov 2018 08:10) (14 - 34)  SpO2: 98% (09 Nov 2018 08:10) (96% - 99%)  Daily Height in cm: 175.26 (09 Nov 2018 08:26)      GENERAL:  Appears stated age, well-groomed, well-nourished, no distress  HEENT:  NC/AT,  conjunctivae clear and pink, no thyromegaly, nodules, adenopathy, no JVD, sclera -anicteric  CHEST:  Full & symmetric excursion, no increased effort, breath sounds clear  HEART:  Regular rhythm, S1, S2, no murmur/rub/S3/S4, no abdominal bruit, no edema  ABDOMEN:  Soft, non-tender, non-distended, normoactive bowel sounds,  no masses ,no hepato-splenomegaly, no signs of chronic liver disease  EXTEREMITIES:  no cyanosis,clubbing or edema  SKIN:  No rash/erythema/ecchymoses/petechiae/wounds/abscess/warm/dry  NEURO:  Alert, oriented, no asterixis, no tremor, no encephalopathy    LABS:                        15.5   13.62 )-----------( 304      ( 09 Nov 2018 04:55 )             46.0     11-09    137  |  99  |  19  ----------------------------<  103<H>  4.2   |  23  |  1.2    Ca    9.5      09 Nov 2018 04:55  Phos  3.3     11-09  Mg     1.9     11-09    TPro  7.9  /  Alb  4.4  /  TBili  0.4  /  DBili  <0.2  /  AST  30  /  ALT  22  /  AlkPhos  90  11-07    LIVER FUNCTIONS - ( 07 Nov 2018 19:00 )  Alb: 4.4 g/dL / Pro: 7.9 g/dL / ALK PHOS: 90 U/L / ALT: 22 U/L / AST: 30 U/L / GGT: x           PT/INR - ( 07 Nov 2018 22:00 )   PT: 11.80 sec;   INR: 1.03 ratio         PTT - ( 09 Nov 2018 04:55 )  PTT:32.3 sec

## 2018-11-09 NOTE — DISCHARGE NOTE ADULT - MEDICATION SUMMARY - MEDICATIONS TO TAKE
I will START or STAY ON the medications listed below when I get home from the hospital:    aspirin 81 mg oral tablet  -- 1 tab(s) by mouth once a day  -- Indication: For CAD (coronary artery disease)    enalapril 10 mg oral tablet  -- 1 tab(s) by mouth once a day   -- Do not take this drug if you are pregnant.  It is very important that you take or use this exactly as directed.  Do not skip doses or discontinue unless directed by your doctor.  Some non-prescription drugs may aggravate your condition.  Read all labels carefully.  If a warning appears, check with your doctor before taking.    -- Indication: For HTN (hypertension)    atorvastatin 80 mg oral tablet  -- 1 tab(s) by mouth once a day  -- Indication: For CAD (coronary artery disease)    prasugrel 10 mg oral tablet  -- 1 tab(s) by mouth once a day MDD:1  -- It is very important that you take or use this exactly as directed.  Do not skip doses or discontinue unless directed by your doctor.  Obtain medical advice before taking any non-prescription drugs as some may affect the action of this medication.  Swallow whole.  Do not crush.    -- Indication: For CAD (coronary artery disease)    Toprol-XL 50 mg oral tablet, extended release  -- 1 tab(s) by mouth once a day  -- Indication: For CAD (coronary artery disease)    Norvasc 10 mg oral tablet  -- 1 tab(s) by mouth once a day  -- Indication: For HTN (hypertension)

## 2018-11-09 NOTE — DISCHARGE NOTE ADULT - PLAN OF CARE
Stabilization and prevent recurrence s/p PCI, with stent placement in RCA.  please cw medication and follow up with Dr. Lu (cardiology)  No heavy lifting.  care for catheterization access site.  follow up in 2 weeks with Dr. lu

## 2018-11-09 NOTE — DISCHARGE NOTE ADULT - HOSPITAL COURSE
42 yo M with PMH of CAD s/p PCI (RCA/LAD), HTN, DLD presented to ED complaining of severe substernal burning chest pain. Patient had 1 episode yesterday and another today, both lasting more than 1 hour, associated with nausea, diaphoresis, 10/10 pain at its worst. Pain improved upon arrival to ED but still reports some discomfort. Denies any cough, fevers, chills, abdominal pain, headaches, myalgias, or other complaint or symptom.    In ED trops were 0.1, nonspecific EKG changes, was evaluated by Cardio who recommended treating patient for NSTEMI.   Developed Stemi in II, III, and avF in the cath lab, s/p stent placement in RCA, s/p nitroglycerin drip.  patient symptom controlled in the morning.  lipitor increased to 80 mg,  lisinopril inc to 10 mg daily,  other medication stays the same.    follow up with Dr. Andersen in 2 weeks.  Medrecs done with Dr. Ordonez.

## 2018-11-09 NOTE — DISCHARGE NOTE ADULT - CARE PROVIDER_API CALL
Yamil Odonnell), Internal Medicine  [address] [place] [address]  Phone: [phone]  Fax: [phone]    Matthew Andersen), Cardiovascular Disease; Internal Medicine; Interventional Cardiology; Nuclear Cardiology  [address] [place] [address]  Phone: [phone]  Fax: [phone]

## 2018-11-09 NOTE — DISCHARGE NOTE ADULT - CARE PLAN
Principal Discharge DX:	Non-STEMI (non-ST elevated myocardial infarction)  Goal:	Stabilization and prevent recurrence  Assessment and plan of treatment:	s/p PCI, with stent placement in RCA.  please cw medication and follow up with Dr. Lu (cardiology)  No heavy lifting.  care for catheterization access site.  follow up in 2 weeks with Dr. lu

## 2018-11-09 NOTE — PROGRESS NOTE ADULT - SUBJECTIVE AND OBJECTIVE BOX
Cardiology Follow up    RANDAL LEACH   43yMale  PAST MEDICAL & SURGICAL HISTORY:  CAD (coronary artery disease)  HLD (hyperlipidemia)  HTN (hypertension)  No significant past surgical history    Allergies    No Known Allergies    Intolerances      PT SEEN AND EXAMINED WITH DR. GARDNER IN CCU  Patient without complaints. Pt ambulated without issues/symptoms  Denies CP, SOB, palpitations, or dizziness  4 beats NSVT on telemetry overnight    Vital Signs Last 24 Hrs  T(C): 36.7 (09 Nov 2018 00:00), Max: 36.7 (09 Nov 2018 00:00)  T(F): 98 (09 Nov 2018 00:00), Max: 98 (09 Nov 2018 00:00)  HR: 62 (09 Nov 2018 07:00) (54 - 92)  BP: 147/96 (09 Nov 2018 07:00) (108/64 - 167/106)  BP(mean): 114 (09 Nov 2018 07:00) (85 - 129)  RR: 17 (09 Nov 2018 07:00) (14 - 34)  SpO2: 97% (09 Nov 2018 00:00) (96% - 99%)Allergies        NAD, appears well  S1S2, no murmurs, no JVD  CTA B/L, no wheeze, no rales  SNT +BS  Ext: Right Radial : NO   hematoma or   bleeding,; C/D/I, + pulses, mvmt, sensation, warmth, + refill      Pulses:  +Rad/ +PTs /+DPs/ same as baseline  A&Ox 3    EKG   P                                                                                                                    2D ECHO  P    LABS                        15.5   13.62 )-----------( 304      ( 09 Nov 2018 04:55 )             46.0     11-09    137  |  99  |  19  ----------------------------<  103<H>  4.2   |  23  |  1.2    Ca    9.5      09 Nov 2018 04:55  Phos  3.3     11-09  Mg     1.9     11-09    TPro  7.9  /  Alb  4.4  /  TBili  0.4  /  DBili  <0.2  /  AST  30  /  ALT  22  /  AlkPhos  90  11-07    CARDIAC MARKERS ( 09 Nov 2018 04:55 )  x     / 0.90 ng/mL / 726 U/L / x     / 78.1 ng/mL  CARDIAC MARKERS ( 08 Nov 2018 23:30 )  x     / 0.96 ng/mL / x     / x     / 47.8 ng/mL  CARDIAC MARKERS ( 08 Nov 2018 05:24 )  x     / 0.71 ng/mL / x     / x     / 42.8 ng/mL  CARDIAC MARKERS ( 07 Nov 2018 19:00 )  x     / 0.10 ng/mL / x     / x     / x          Magnesium, Serum: 1.9 mg/dL [1.8 - 2.4] (11-09-18 @ 04:55)  LIVER FUNCTIONS - ( 07 Nov 2018 19:00 )  Alb: 4.4 g/dL / Pro: 7.9 g/dL / ALK PHOS: 90 U/L / ALT: 22 U/L / AST: 30 U/L / GGT: x             A/P:  I discussed the case with Interventional Cardiologist Dr. Gardner & recommends the following:    S/P PCI mRCA/NSTEMI  	         Continue DAPT(asa 81mg daily, effient 10 mg daily),  B-Blocker, Statin Therapy with prior meds                    effient coverag confirmed with pts pharmacy, copay $10, pt aware and agreeable                     obtain echo and f/u results                    oob-ch, ambulate                    Pt given instructions on importance of taking antiplatelet medication or risk acute stent thrombosis/death                   Post cath instructions, access site care and activity restrictions reviewed with patient                     Discussed with patient to return to hospital if experience chest pain, shortness breath, dizziness and site bleeding                   Aggressive risk factor modification,  diet counseling, smoking cessation discussed with patient                       anticipate d/c home today if echo wnl and ambulating without symptoms                     Follow up with Cardiology Dr. Gardner   in one week.  Instructed to call and make an appointment Cardiology Follow up    RANDAL LEACH   43yMale  PAST MEDICAL & SURGICAL HISTORY:  CAD (coronary artery disease)  HLD (hyperlipidemia)  HTN (hypertension)  No significant past surgical history    Allergies    No Known Allergies    Intolerances      PT SEEN AND EXAMINED WITH DR. GARDNER IN CCU  Patient without complaints. Pt ambulated without issues/symptoms  Denies CP, SOB, palpitations, or dizziness  4 beats NSVT on telemetry overnight    Vital Signs Last 24 Hrs  T(C): 36.7 (09 Nov 2018 00:00), Max: 36.7 (09 Nov 2018 00:00)  T(F): 98 (09 Nov 2018 00:00), Max: 98 (09 Nov 2018 00:00)  HR: 62 (09 Nov 2018 07:00) (54 - 92)  BP: 147/96 (09 Nov 2018 07:00) (108/64 - 167/106)  BP(mean): 114 (09 Nov 2018 07:00) (85 - 129)  RR: 17 (09 Nov 2018 07:00) (14 - 34)  SpO2: 97% (09 Nov 2018 00:00) (96% - 99%)Allergies        NAD, appears well  S1S2, no murmurs, no JVD  CTA B/L, no wheeze, no rales  SNT +BS  Ext: Right Radial : NO   hematoma or   bleeding,; C/D/I, + pulses, mvmt, sensation, warmth, + refill      Pulses:  +Rad/ +PTs /+DPs/ same as baseline  A&Ox 3    EKG   P                                                                                                                    2D ECHO  P    LABS                        15.5   13.62 )-----------( 304      ( 09 Nov 2018 04:55 )             46.0     11-09    137  |  99  |  19  ----------------------------<  103<H>  4.2   |  23  |  1.2    Ca    9.5      09 Nov 2018 04:55  Phos  3.3     11-09  Mg     1.9     11-09    TPro  7.9  /  Alb  4.4  /  TBili  0.4  /  DBili  <0.2  /  AST  30  /  ALT  22  /  AlkPhos  90  11-07    CARDIAC MARKERS ( 09 Nov 2018 04:55 )  x     / 0.90 ng/mL / 726 U/L / x     / 78.1 ng/mL  CARDIAC MARKERS ( 08 Nov 2018 23:30 )  x     / 0.96 ng/mL / x     / x     / 47.8 ng/mL  CARDIAC MARKERS ( 08 Nov 2018 05:24 )  x     / 0.71 ng/mL / x     / x     / 42.8 ng/mL  CARDIAC MARKERS ( 07 Nov 2018 19:00 )  x     / 0.10 ng/mL / x     / x     / x          Magnesium, Serum: 1.9 mg/dL [1.8 - 2.4] (11-09-18 @ 04:55)  LIVER FUNCTIONS - ( 07 Nov 2018 19:00 )  Alb: 4.4 g/dL / Pro: 7.9 g/dL / ALK PHOS: 90 U/L / ALT: 22 U/L / AST: 30 U/L / GGT: x             A/P:  I discussed the case with Interventional Cardiologist Dr. Gardner & recommends the following:    S/P PCI mRCA/NSTEMI  	         Continue DAPT(asa 81mg daily, effient 10 mg daily),  B-Blocker, Statin Therapy with prior meds                    effient coverag confirmed with pts pharmacy, copay $10, pt aware and agreeable                     obtain echo and f/u results                     encourage po fluids                    oob-ch, ambulate                    Pt given instructions on importance of taking antiplatelet medication or risk acute stent thrombosis/death                   Post cath instructions, access site care and activity restrictions reviewed with patient                     Discussed with patient to return to hospital if experience chest pain, shortness breath, dizziness and site bleeding                   Aggressive risk factor modification,  diet counseling, smoking cessation discussed with patient                       anticipate d/c home today if echo wnl and ambulating without symptoms                     Follow up with Cardiology Dr. Gardner   in one week.  Instructed to call and make an appointment

## 2018-11-15 DIAGNOSIS — I25.10 ATHEROSCLEROTIC HEART DISEASE OF NATIVE CORONARY ARTERY WITHOUT ANGINA PECTORIS: ICD-10-CM

## 2018-11-15 DIAGNOSIS — I21.4 NON-ST ELEVATION (NSTEMI) MYOCARDIAL INFARCTION: ICD-10-CM

## 2018-11-15 DIAGNOSIS — Z95.5 PRESENCE OF CORONARY ANGIOPLASTY IMPLANT AND GRAFT: ICD-10-CM

## 2018-11-15 DIAGNOSIS — Z87.891 PERSONAL HISTORY OF NICOTINE DEPENDENCE: ICD-10-CM

## 2018-11-15 DIAGNOSIS — I10 ESSENTIAL (PRIMARY) HYPERTENSION: ICD-10-CM

## 2019-05-24 PROBLEM — I25.10 ATHEROSCLEROTIC HEART DISEASE OF NATIVE CORONARY ARTERY WITHOUT ANGINA PECTORIS: Chronic | Status: ACTIVE | Noted: 2018-11-08

## 2019-05-24 PROBLEM — E78.5 HYPERLIPIDEMIA, UNSPECIFIED: Chronic | Status: ACTIVE | Noted: 2018-11-08

## 2019-05-24 PROBLEM — I10 ESSENTIAL (PRIMARY) HYPERTENSION: Chronic | Status: ACTIVE | Noted: 2018-11-08

## 2019-06-04 ENCOUNTER — APPOINTMENT (OUTPATIENT)
Dept: CARDIOLOGY | Facility: CLINIC | Age: 44
End: 2019-06-04
Payer: COMMERCIAL

## 2019-06-04 PROCEDURE — 93000 ELECTROCARDIOGRAM COMPLETE: CPT

## 2019-06-04 PROCEDURE — 99213 OFFICE O/P EST LOW 20 MIN: CPT | Mod: 25

## 2019-07-03 ENCOUNTER — RECORD ABSTRACTING (OUTPATIENT)
Age: 44
End: 2019-07-03

## 2019-07-03 DIAGNOSIS — I21.9 ACUTE MYOCARDIAL INFARCTION, UNSPECIFIED: ICD-10-CM

## 2019-07-03 DIAGNOSIS — Z82.49 FAMILY HISTORY OF ISCHEMIC HEART DISEASE AND OTHER DISEASES OF THE CIRCULATORY SYSTEM: ICD-10-CM

## 2019-07-03 DIAGNOSIS — Z95.5 PRESENCE OF CORONARY ANGIOPLASTY IMPLANT AND GRAFT: ICD-10-CM

## 2019-07-03 RX ORDER — ASPIRIN 81 MG
81 TABLET, DELAYED RELEASE (ENTERIC COATED) ORAL DAILY
Refills: 0 | Status: ACTIVE | COMMUNITY

## 2019-10-08 ENCOUNTER — APPOINTMENT (OUTPATIENT)
Dept: CARDIOLOGY | Facility: CLINIC | Age: 44
End: 2019-10-08
Payer: COMMERCIAL

## 2019-10-08 VITALS
HEIGHT: 69 IN | BODY MASS INDEX: 28.58 KG/M2 | HEART RATE: 56 BPM | WEIGHT: 193 LBS | DIASTOLIC BLOOD PRESSURE: 88 MMHG | SYSTOLIC BLOOD PRESSURE: 118 MMHG

## 2019-10-08 PROCEDURE — 93000 ELECTROCARDIOGRAM COMPLETE: CPT

## 2019-10-08 PROCEDURE — 99213 OFFICE O/P EST LOW 20 MIN: CPT

## 2019-10-08 NOTE — ASSESSMENT
[FreeTextEntry1] : # Coronary artery disease  (I25.10):\par # Hypertension  (I10):\par # Dyslipidemia  (E78.5):\par # History of placement of stent in anterior descending branch of left coronary artery  (Z95.5):\par \par 43 yo male with pmhx and presentation as above\par cad, ccs class 1 \par  htn/dyslipidemia\par \par Cont current mx\par  weight reduction and daily exercise strongly encouraged\par Repeat labs, will call in with results\par Stress echo 3/19 - no ischemia at high workload\par Aggressive risk modif\par RTC 3-4 months\par

## 2019-10-08 NOTE — HISTORY OF PRESENT ILLNESS
[FreeTextEntry1] : 45 yo male with pmhx as below was admitted to [facility] with acute mi, underwent successful pci of dis rca and mid lad with adama. Hospital course was sign for mild access site hematoma, pt was started on dapt, b blocker, ccb and a statin and sent home with cardiology f/up.\par 11/18 readmitted wt ami, s/p pci of mid rca with adama\par Here for a f/up visit, no c/o cp, sob, burnette, pnd, orthopnea, or peripheral edema. No palpitations or exertional symptoms, no syncope or dizziness. \par ET is stable, + exercising on and off

## 2019-10-08 NOTE — PHYSICAL EXAM
[General Appearance - Well Developed] : well developed [Normal Appearance] : normal appearance [Well Groomed] : well groomed [General Appearance - Well Nourished] : well nourished [No Deformities] : no deformities [Normal Oral Mucosa] : normal oral mucosa [General Appearance - In No Acute Distress] : no acute distress [No Oral Pallor] : no oral pallor [No Oral Cyanosis] : no oral cyanosis [Normal Jugular Venous V Waves Present] : normal jugular venous V waves present [Normal Jugular Venous A Waves Present] : normal jugular venous A waves present [No Jugular Venous Ebnson A Waves] : no jugular venous benson A waves [Respiration, Rhythm And Depth] : normal respiratory rhythm and effort [Exaggerated Use Of Accessory Muscles For Inspiration] : no accessory muscle use [Auscultation Breath Sounds / Voice Sounds] : lungs were clear to auscultation bilaterally [Heart Rate And Rhythm] : heart rate and rhythm were normal [Heart Sounds] : normal S1 and S2 [Murmurs] : no murmurs present [Abdomen Soft] : soft [Abdomen Tenderness] : non-tender [Abdomen Mass (___ Cm)] : no abdominal mass palpated [Nail Clubbing] : no clubbing of the fingernails [] : no ischemic changes [Cyanosis, Localized] : no localized cyanosis [Petechial Hemorrhages (___cm)] : no petechial hemorrhages [Skin Color & Pigmentation] : normal skin color and pigmentation [Oriented To Time, Place, And Person] : oriented to person, place, and time

## 2020-01-21 ENCOUNTER — APPOINTMENT (OUTPATIENT)
Dept: CARDIOLOGY | Facility: CLINIC | Age: 45
End: 2020-01-21
Payer: COMMERCIAL

## 2020-01-21 VITALS
SYSTOLIC BLOOD PRESSURE: 130 MMHG | WEIGHT: 190 LBS | BODY MASS INDEX: 28.06 KG/M2 | DIASTOLIC BLOOD PRESSURE: 90 MMHG | HEART RATE: 70 BPM

## 2020-01-21 VITALS — SYSTOLIC BLOOD PRESSURE: 118 MMHG | DIASTOLIC BLOOD PRESSURE: 86 MMHG

## 2020-01-21 PROCEDURE — 99214 OFFICE O/P EST MOD 30 MIN: CPT

## 2020-01-21 PROCEDURE — 93000 ELECTROCARDIOGRAM COMPLETE: CPT

## 2020-01-21 NOTE — PHYSICAL EXAM
[General Appearance - Well Developed] : well developed [Normal Appearance] : normal appearance [Well Groomed] : well groomed [General Appearance - Well Nourished] : well nourished [No Deformities] : no deformities [General Appearance - In No Acute Distress] : no acute distress [Normal Oral Mucosa] : normal oral mucosa [No Oral Pallor] : no oral pallor [No Oral Cyanosis] : no oral cyanosis [Normal Jugular Venous A Waves Present] : normal jugular venous A waves present [Normal Jugular Venous V Waves Present] : normal jugular venous V waves present [No Jugular Venous Benson A Waves] : no jugular venous benson A waves [Heart Rate And Rhythm] : heart rate and rhythm were normal [Heart Sounds] : normal S1 and S2 [Murmurs] : no murmurs present [Respiration, Rhythm And Depth] : normal respiratory rhythm and effort [Exaggerated Use Of Accessory Muscles For Inspiration] : no accessory muscle use [Auscultation Breath Sounds / Voice Sounds] : lungs were clear to auscultation bilaterally [Abdomen Soft] : soft [Abdomen Tenderness] : non-tender [Nail Clubbing] : no clubbing of the fingernails [Abdomen Mass (___ Cm)] : no abdominal mass palpated [Cyanosis, Localized] : no localized cyanosis [] : no ischemic changes [Petechial Hemorrhages (___cm)] : no petechial hemorrhages [Skin Color & Pigmentation] : normal skin color and pigmentation [Oriented To Time, Place, And Person] : oriented to person, place, and time

## 2020-01-21 NOTE — HISTORY OF PRESENT ILLNESS
[FreeTextEntry1] : 46 yo male with pmhx as below was admitted to [facility] with acute mi, underwent successful pci of dis rca and mid lad with adama. Hospital course was sign for mild access site hematoma, pt was started on dapt, b blocker, ccb and a statin and sent home with cardiology f/up.\par 11/18 readmitted wth ami, s/p pci of mid rca with adama\par Here for a f/up visit, occasional burnette with mod effort\par otherwise no c/o cp, sob, pnd, orthopnea, or peripheral edema. No palpitations or exertional symptoms, no syncope or dizziness. \par ET is stable, + exercising on and off

## 2020-01-21 NOTE — ASSESSMENT
[FreeTextEntry1] : # Coronary artery disease  (I25.10):\par # Hypertension  (I10):\par # Dyslipidemia  (E78.5):\par # History of placement of stent in anterior descending branch of left coronary artery  (Z95.5):\par \par 44 yo male with pmhx and presentation as above\par cad, ccs class 1 \par  htn/dyslipidemia\par \par Cont current mx\par  weight reduction and daily exercise strongly encouraged\par Repeat labs reviewed, lipids at goal, renal fnx noted, hydration and bp mx discussed, may need renal eval\par Stress echo 3/19 - no ischemia at high workload\par change Effient to 5 mg\par Aggressive risk modif\par RTC 3-4 months, labs 1 week prior\par

## 2020-05-12 ENCOUNTER — APPOINTMENT (OUTPATIENT)
Dept: CARDIOLOGY | Facility: CLINIC | Age: 45
End: 2020-05-12

## 2020-08-11 ENCOUNTER — RESULT CHARGE (OUTPATIENT)
Age: 45
End: 2020-08-11

## 2020-08-11 ENCOUNTER — APPOINTMENT (OUTPATIENT)
Dept: CARDIOLOGY | Facility: CLINIC | Age: 45
End: 2020-08-11
Payer: COMMERCIAL

## 2020-08-11 VITALS
DIASTOLIC BLOOD PRESSURE: 60 MMHG | HEART RATE: 51 BPM | TEMPERATURE: 97.5 F | SYSTOLIC BLOOD PRESSURE: 110 MMHG | BODY MASS INDEX: 28.06 KG/M2 | WEIGHT: 190 LBS

## 2020-08-11 PROCEDURE — 99213 OFFICE O/P EST LOW 20 MIN: CPT

## 2020-08-11 PROCEDURE — 93000 ELECTROCARDIOGRAM COMPLETE: CPT

## 2020-08-11 NOTE — PHYSICAL EXAM
[General Appearance - Well Developed] : well developed [Normal Appearance] : normal appearance [Well Groomed] : well groomed [General Appearance - Well Nourished] : well nourished [No Deformities] : no deformities [General Appearance - In No Acute Distress] : no acute distress [Normal Oral Mucosa] : normal oral mucosa [No Oral Pallor] : no oral pallor [Normal Jugular Venous A Waves Present] : normal jugular venous A waves present [No Oral Cyanosis] : no oral cyanosis [Normal Jugular Venous V Waves Present] : normal jugular venous V waves present [No Jugular Venous Benson A Waves] : no jugular venous benson A waves [Respiration, Rhythm And Depth] : normal respiratory rhythm and effort [Exaggerated Use Of Accessory Muscles For Inspiration] : no accessory muscle use [Auscultation Breath Sounds / Voice Sounds] : lungs were clear to auscultation bilaterally [Heart Rate And Rhythm] : heart rate and rhythm were normal [Murmurs] : no murmurs present [Heart Sounds] : normal S1 and S2 [Abdomen Tenderness] : non-tender [Abdomen Soft] : soft [Abdomen Mass (___ Cm)] : no abdominal mass palpated [Nail Clubbing] : no clubbing of the fingernails [Petechial Hemorrhages (___cm)] : no petechial hemorrhages [Cyanosis, Localized] : no localized cyanosis [] : no ischemic changes [Skin Color & Pigmentation] : normal skin color and pigmentation [Oriented To Time, Place, And Person] : oriented to person, place, and time

## 2020-08-11 NOTE — ASSESSMENT
[FreeTextEntry1] : # Coronary artery disease  (I25.10):\par # Hypertension  (I10):\par # Dyslipidemia  (E78.5):\par # History of placement of stent in anterior descending branch of left coronary artery  (Z95.5):\par \par 46 yo male with pmhx and presentation as above\par cad, ccs class 1 \par  htn/dyslipidemia\par \par Cont current mx\par  weight reduction and daily exercise strongly encouraged\par Repeat labs today, will call in with results\par Stress echo 3/19 - no ischemia at high workload\par repeat stress echo next visit to assess for stent patency\par Cont Effient  5 mg until 11/21\par Aggressive risk modif\par RTC 6 months\par

## 2020-08-11 NOTE — HISTORY OF PRESENT ILLNESS
[FreeTextEntry1] : 46 yo male with pmhx as below was admitted to [facility] with acute mi, underwent successful pci of dis rca and mid lad with adama. Hospital course was sign for mild access site hematoma, pt was started on dapt, b blocker, ccb and a statin and sent home with cardiology f/up.\par 11/18 readmitted wt ami, s/p pci of mid rca with adama\par Here for a f/up visit, doing well, was in self isolation for a few months due to covid\par otherwise no c/o cp, sob, pnd, orthopnea, or peripheral edema. No palpitations or exertional symptoms, no syncope or dizziness. \par ET is stable

## 2020-08-13 LAB
ALBUMIN SERPL ELPH-MCNC: 5 G/DL
ALP BLD-CCNC: 111 U/L
ALT SERPL-CCNC: 22 U/L
ANION GAP SERPL CALC-SCNC: 24 MMOL/L
AST SERPL-CCNC: 22 U/L
BASOPHILS # BLD AUTO: 0.11 K/UL
BASOPHILS NFR BLD AUTO: 1.3 %
BILIRUB SERPL-MCNC: 0.4 MG/DL
BUN SERPL-MCNC: 22 MG/DL
CALCIUM SERPL-MCNC: 9.6 MG/DL
CHLORIDE SERPL-SCNC: 98 MMOL/L
CHOLEST SERPL-MCNC: 137 MG/DL
CHOLEST/HDLC SERPL: 3.7 RATIO
CK SERPL-CCNC: 171 U/L
CO2 SERPL-SCNC: 21 MMOL/L
CREAT SERPL-MCNC: 1.48 MG/DL
EOSINOPHIL # BLD AUTO: 0.69 K/UL
EOSINOPHIL NFR BLD AUTO: 8.3 %
ESTIMATED AVERAGE GLUCOSE: 114 MG/DL
GLUCOSE SERPL-MCNC: 53 MG/DL
HBA1C MFR BLD HPLC: 5.6 %
HCT VFR BLD CALC: 50 %
HDLC SERPL-MCNC: 37 MG/DL
HGB BLD-MCNC: 15.3 G/DL
IMM GRANULOCYTES NFR BLD AUTO: 0.4 %
LDLC SERPL CALC-MCNC: 79 MG/DL
LYMPHOCYTES # BLD AUTO: 1.73 K/UL
LYMPHOCYTES NFR BLD AUTO: 20.8 %
MAN DIFF?: NORMAL
MCHC RBC-ENTMCNC: 28.1 PG
MCHC RBC-ENTMCNC: 30.6 GM/DL
MCV RBC AUTO: 91.7 FL
MONOCYTES # BLD AUTO: 0.77 K/UL
MONOCYTES NFR BLD AUTO: 9.3 %
NEUTROPHILS # BLD AUTO: 4.99 K/UL
NEUTROPHILS NFR BLD AUTO: 59.9 %
PLATELET # BLD AUTO: 364 K/UL
POTASSIUM SERPL-SCNC: 4.4 MMOL/L
PROT SERPL-MCNC: 7.7 G/DL
RBC # BLD: 5.45 M/UL
RBC # FLD: 13.8 %
SODIUM SERPL-SCNC: 143 MMOL/L
TRIGL SERPL-MCNC: 104 MG/DL
TSH SERPL-ACNC: 1.07 UIU/ML
WBC # FLD AUTO: 8.32 K/UL

## 2020-08-24 ENCOUNTER — RX RENEWAL (OUTPATIENT)
Age: 45
End: 2020-08-24

## 2021-03-23 ENCOUNTER — APPOINTMENT (OUTPATIENT)
Dept: CARDIOLOGY | Facility: CLINIC | Age: 46
End: 2021-03-23
Payer: COMMERCIAL

## 2021-03-23 VITALS
RESPIRATION RATE: 16 BRPM | SYSTOLIC BLOOD PRESSURE: 130 MMHG | DIASTOLIC BLOOD PRESSURE: 82 MMHG | HEART RATE: 64 BPM | BODY MASS INDEX: 28.14 KG/M2 | WEIGHT: 190 LBS | HEIGHT: 69 IN

## 2021-03-23 DIAGNOSIS — Z00.00 ENCOUNTER FOR GENERAL ADULT MEDICAL EXAMINATION W/OUT ABNORMAL FINDINGS: ICD-10-CM

## 2021-03-23 PROCEDURE — 93351 STRESS TTE COMPLETE: CPT

## 2021-03-23 PROCEDURE — 93320 DOPPLER ECHO COMPLETE: CPT

## 2021-03-23 PROCEDURE — 99214 OFFICE O/P EST MOD 30 MIN: CPT | Mod: 25

## 2021-03-23 PROCEDURE — 93325 DOPPLER ECHO COLOR FLOW MAPG: CPT

## 2021-03-23 PROCEDURE — 99072 ADDL SUPL MATRL&STAF TM PHE: CPT

## 2021-03-23 NOTE — HISTORY OF PRESENT ILLNESS
[FreeTextEntry1] : 45 yo male with pmhx as below was admitted to [facility] with acute mi, underwent successful pci of dis rca and mid lad with adama. Hospital course was sign for mild access site hematoma, pt was started on dapt, b blocker, ccb and a statin and sent home with cardiology f/up.\par 11/18 readmitted wth ami, s/p pci of mid rca with adama\par Here for a f/up visit, doing well\par no c/o cp, sob, pnd, orthopnea, or peripheral edema. No palpitations or exertional symptoms, no syncope or dizziness. \par ET is stable

## 2021-03-23 NOTE — ASSESSMENT
[FreeTextEntry1] : # Coronary artery disease  (I25.10):\par # Hypertension  (I10):\par # Dyslipidemia  (E78.5):\par # History of placement of stent in anterior descending branch of left coronary artery  (Z95.5):\par \par 45 yo male with pmhx and presentation as above\par cad, ccs class 1/2 \par  htn/dyslipidemia\par \par Cont current mx\par  weight reduction and daily exercise strongly encouraged\par Repeat labs reviewed, needs better lipid mx\par Stress echo today- no ischemia at high workload\par Cont Effient  5 mg until 11/21\par Aggressive risk modif\par RTC 6 months\par

## 2021-03-23 NOTE — PHYSICAL EXAM
[General Appearance - Well Developed] : well developed [Normal Appearance] : normal appearance [Well Groomed] : well groomed [General Appearance - Well Nourished] : well nourished [No Deformities] : no deformities [General Appearance - In No Acute Distress] : no acute distress [Normal Oral Mucosa] : normal oral mucosa [No Oral Pallor] : no oral pallor [No Oral Cyanosis] : no oral cyanosis [Normal Jugular Venous A Waves Present] : normal jugular venous A waves present [Normal Jugular Venous V Waves Present] : normal jugular venous V waves present [No Jugular Venous Benson A Waves] : no jugular venous benson A waves [Respiration, Rhythm And Depth] : normal respiratory rhythm and effort [Exaggerated Use Of Accessory Muscles For Inspiration] : no accessory muscle use [Auscultation Breath Sounds / Voice Sounds] : lungs were clear to auscultation bilaterally [Heart Rate And Rhythm] : heart rate and rhythm were normal [Heart Sounds] : normal S1 and S2 [Murmurs] : no murmurs present [Abdomen Soft] : soft [Abdomen Tenderness] : non-tender [Abdomen Mass (___ Cm)] : no abdominal mass palpated [Nail Clubbing] : no clubbing of the fingernails [Cyanosis, Localized] : no localized cyanosis [Petechial Hemorrhages (___cm)] : no petechial hemorrhages [] : no ischemic changes [Skin Color & Pigmentation] : normal skin color and pigmentation [Oriented To Time, Place, And Person] : oriented to person, place, and time

## 2021-09-14 ENCOUNTER — APPOINTMENT (OUTPATIENT)
Dept: CARDIOLOGY | Facility: CLINIC | Age: 46
End: 2021-09-14

## 2021-12-18 ENCOUNTER — RX RENEWAL (OUTPATIENT)
Age: 46
End: 2021-12-18

## 2022-03-07 ENCOUNTER — RX RENEWAL (OUTPATIENT)
Age: 47
End: 2022-03-07

## 2022-03-20 ENCOUNTER — RX RENEWAL (OUTPATIENT)
Age: 47
End: 2022-03-20

## 2022-06-25 ENCOUNTER — RX RENEWAL (OUTPATIENT)
Age: 47
End: 2022-06-25

## 2022-08-05 NOTE — DISCHARGE NOTE ADULT - PATIENT PORTAL LINK FT
No You can access the [facility] Patient Portal, offered by [facility], by registering with the following website: [email or web address]

## 2023-03-28 RX ORDER — PRASUGREL 5 MG/1
5 TABLET, FILM COATED ORAL
Qty: 90 | Refills: 3 | Status: DISCONTINUED | COMMUNITY
Start: 2022-03-07 | End: 2023-03-28

## 2023-04-03 NOTE — PATIENT PROFILE ADULT - FALL HARM RISK
For information on Fall & Injury Prevention, visit: [email or web address] OR  [email or web address] OR  https://www.cdc.gov/steadi/patient.html
coagulation(Bleeding disorder R/T clinical cond/anti-coags)

## 2023-04-04 ENCOUNTER — APPOINTMENT (OUTPATIENT)
Dept: CARDIOLOGY | Facility: CLINIC | Age: 48
End: 2023-04-04
Payer: COMMERCIAL

## 2023-04-04 ENCOUNTER — NON-APPOINTMENT (OUTPATIENT)
Age: 48
End: 2023-04-04

## 2023-04-04 VITALS
WEIGHT: 191 LBS | HEIGHT: 69 IN | HEART RATE: 76 BPM | RESPIRATION RATE: 16 BRPM | OXYGEN SATURATION: 98 % | DIASTOLIC BLOOD PRESSURE: 75 MMHG | SYSTOLIC BLOOD PRESSURE: 120 MMHG | TEMPERATURE: 96.6 F | BODY MASS INDEX: 28.29 KG/M2

## 2023-04-04 VITALS
RESPIRATION RATE: 16 BRPM | DIASTOLIC BLOOD PRESSURE: 85 MMHG | SYSTOLIC BLOOD PRESSURE: 120 MMHG | TEMPERATURE: 96.6 F | OXYGEN SATURATION: 99 % | HEIGHT: 69 IN | HEART RATE: 66 BPM | BODY MASS INDEX: 28.29 KG/M2 | WEIGHT: 191 LBS

## 2023-04-04 PROCEDURE — 93000 ELECTROCARDIOGRAM COMPLETE: CPT

## 2023-04-04 PROCEDURE — 99214 OFFICE O/P EST MOD 30 MIN: CPT | Mod: 25

## 2023-04-04 NOTE — HISTORY OF PRESENT ILLNESS
[FreeTextEntry1] : 49 yo male with pmhx as below was admitted to [facility] with acute mi, underwent successful pci of dis rca and mid lad with adama. Hospital course was sign for mild access site hematoma, pt was started on dapt, b blocker, ccb and a statin and sent home with cardiology f/up.\par 11/18 readmitted wth ami, s/p pci of mid rca with adama\par Here for a f/up visit, after 2 year hiatus\par s/p covid, occasional cp\par no c/o sob, pnd, orthopnea, or peripheral edema. No palpitations or exertional symptoms, no syncope or dizziness. \par ET is stable

## 2023-04-04 NOTE — ASSESSMENT
[FreeTextEntry1] : # Coronary artery disease  (I25.10):\par # Hypertension  (I10):\par # Dyslipidemia  (E78.5):\par # History of placement of stent in anterior descending branch of left coronary artery  (Z95.5):\par \par 49 yo male with pmhx and presentation as above\par cad, ccs class 1/2 \par  htn/dyslipidemia\par \par Cont current mx\par  weight reduction and daily exercise strongly encouraged\par Repeat labs \par Stress echo \par d/c  Effient  5 mg\par Aggressive risk modif\par RTC for stress pending ins auth\par

## 2023-07-18 ENCOUNTER — APPOINTMENT (OUTPATIENT)
Dept: CARDIOLOGY | Facility: CLINIC | Age: 48
End: 2023-07-18

## 2023-08-01 ENCOUNTER — APPOINTMENT (OUTPATIENT)
Dept: CARDIOLOGY | Facility: CLINIC | Age: 48
End: 2023-08-01
Payer: COMMERCIAL

## 2023-08-01 VITALS
TEMPERATURE: 97 F | SYSTOLIC BLOOD PRESSURE: 120 MMHG | HEIGHT: 67 IN | OXYGEN SATURATION: 98 % | BODY MASS INDEX: 29.03 KG/M2 | WEIGHT: 185 LBS | HEART RATE: 68 BPM | RESPIRATION RATE: 16 BRPM | DIASTOLIC BLOOD PRESSURE: 75 MMHG

## 2023-08-01 PROCEDURE — 99214 OFFICE O/P EST MOD 30 MIN: CPT | Mod: 25

## 2023-08-01 PROCEDURE — 93325 DOPPLER ECHO COLOR FLOW MAPG: CPT

## 2023-08-01 PROCEDURE — 93320 DOPPLER ECHO COMPLETE: CPT

## 2023-08-01 PROCEDURE — 93351 STRESS TTE COMPLETE: CPT

## 2023-08-01 NOTE — PHYSICAL EXAM
[General Appearance - Well Developed] : well developed [Normal Appearance] : normal appearance [Well Groomed] : well groomed [General Appearance - Well Nourished] : well nourished [No Deformities] : no deformities [General Appearance - In No Acute Distress] : no acute distress [No Oral Pallor] : no oral pallor [Normal Oral Mucosa] : normal oral mucosa [No Oral Cyanosis] : no oral cyanosis [Normal Jugular Venous A Waves Present] : normal jugular venous A waves present [Normal Jugular Venous V Waves Present] : normal jugular venous V waves present [No Jugular Venous Benson A Waves] : no jugular venous benson A waves [Exaggerated Use Of Accessory Muscles For Inspiration] : no accessory muscle use [Respiration, Rhythm And Depth] : normal respiratory rhythm and effort [Auscultation Breath Sounds / Voice Sounds] : lungs were clear to auscultation bilaterally [Heart Rate And Rhythm] : heart rate and rhythm were normal [Heart Sounds] : normal S1 and S2 [Murmurs] : no murmurs present [Abdomen Soft] : soft [Abdomen Tenderness] : non-tender [Abdomen Mass (___ Cm)] : no abdominal mass palpated [Nail Clubbing] : no clubbing of the fingernails [Cyanosis, Localized] : no localized cyanosis [Petechial Hemorrhages (___cm)] : no petechial hemorrhages [] : no ischemic changes [Skin Color & Pigmentation] : normal skin color and pigmentation [Oriented To Time, Place, And Person] : oriented to person, place, and time

## 2023-08-01 NOTE — HISTORY OF PRESENT ILLNESS
[FreeTextEntry1] : 49 yo male with pmhx as below was admitted to [facility] with acute mi, underwent successful pci of dis rca and mid lad with adama. Hospital course was sign for mild access site hematoma, pt was started on dapt, b blocker, ccb and a statin and sent home with cardiology f/up. 11/18 readmitted [facility] ami, s/p pci of mid rca with adama Here for a f/up visit no c/o sob, pnd, orthopnea, or peripheral edema. No palpitations or exertional symptoms, no syncope or dizziness.  ET is stable

## 2023-08-01 NOTE — ASSESSMENT
[FreeTextEntry1] : # Coronary artery disease  (I25.10): # Hypertension  (I10): # Dyslipidemia  (E78.5): # History of placement of stent in anterior descending branch of left coronary artery  (Z95.5):  47 yo male with pmhx and presentation as above cad, ccs class 1/2  htn/dyslipidemia  Cont current mx weight reduction and daily exercise strongly encouraged Repeat labs reviewed, lipds at goal, FBG noted, diet discussed Stress echo - low risk at high workload, cont wiht med rx for cad Aggressive risk modif RTC 6 m

## 2023-12-24 ENCOUNTER — RX RENEWAL (OUTPATIENT)
Age: 48
End: 2023-12-24

## 2023-12-24 RX ORDER — METOPROLOL SUCCINATE 50 MG/1
50 TABLET, EXTENDED RELEASE ORAL
Qty: 90 | Refills: 2 | Status: ACTIVE | COMMUNITY
Start: 2020-08-24 | End: 1900-01-01

## 2023-12-24 RX ORDER — ATORVASTATIN CALCIUM 80 MG/1
80 TABLET, FILM COATED ORAL
Qty: 90 | Refills: 2 | Status: ACTIVE | COMMUNITY
Start: 2022-03-20 | End: 1900-01-01

## 2023-12-24 RX ORDER — ENALAPRIL MALEATE 10 MG/1
10 TABLET ORAL DAILY
Qty: 90 | Refills: 2 | Status: ACTIVE | COMMUNITY
Start: 2020-08-24 | End: 1900-01-01

## 2024-02-06 ENCOUNTER — APPOINTMENT (OUTPATIENT)
Dept: CARDIOLOGY | Facility: CLINIC | Age: 49
End: 2024-02-06
Payer: COMMERCIAL

## 2024-02-06 VITALS
BODY MASS INDEX: 31.39 KG/M2 | HEIGHT: 67 IN | HEART RATE: 62 BPM | WEIGHT: 200 LBS | RESPIRATION RATE: 16 BRPM | SYSTOLIC BLOOD PRESSURE: 120 MMHG | DIASTOLIC BLOOD PRESSURE: 80 MMHG | TEMPERATURE: 96 F

## 2024-02-06 DIAGNOSIS — I10 ESSENTIAL (PRIMARY) HYPERTENSION: ICD-10-CM

## 2024-02-06 DIAGNOSIS — E78.5 HYPERLIPIDEMIA, UNSPECIFIED: ICD-10-CM

## 2024-02-06 DIAGNOSIS — R73.02 IMPAIRED GLUCOSE TOLERANCE (ORAL): ICD-10-CM

## 2024-02-06 DIAGNOSIS — I25.10 ATHEROSCLEROTIC HEART DISEASE OF NATIVE CORONARY ARTERY W/OUT ANGINA PECTORIS: ICD-10-CM

## 2024-02-06 PROCEDURE — 99213 OFFICE O/P EST LOW 20 MIN: CPT | Mod: 25

## 2024-02-06 PROCEDURE — 93000 ELECTROCARDIOGRAM COMPLETE: CPT

## 2024-02-06 RX ORDER — AMLODIPINE BESYLATE 10 MG/1
10 TABLET ORAL
Qty: 90 | Refills: 3 | Status: ACTIVE | COMMUNITY
Start: 2022-06-25 | End: 1900-01-01

## 2024-02-06 NOTE — ASSESSMENT
[FreeTextEntry1] : # Coronary artery disease  (I25.10): # Hypertension  (I10): # Dyslipidemia  (E78.5): # History of placement of stent in anterior descending branch of left coronary artery  (Z95.5):  48 yo male with pmhx and presentation as above cad, ccs class 1/2  htn/dyslipidemia  Cont current mx weight reduction and daily exercise strongly encouraged Repeat labs  2023 Stress echo - low risk at high workload, cont with med rx for cad Aggressive risk modif RTC 6 m

## 2024-02-06 NOTE — HISTORY OF PRESENT ILLNESS
[FreeTextEntry1] : 50 yo male with pmhx as below was admitted to [facility] with acute mi, underwent successful pci of dis rca and mid lad with adama. Hospital course was sign for mild access site hematoma, pt was started on dapt, b blocker, ccb and a statin and sent home with cardiology f/up. 11/18 readmitted with ami, s/p pci of mid rca with adama Here for a f/up visit 8/23 s/po stress echo no c/o sob, pnd, orthopnea, or peripheral edema. No palpitations or exertional symptoms, no syncope or dizziness.  ET is stable

## 2024-06-07 ENCOUNTER — EMERGENCY (EMERGENCY)
Facility: HOSPITAL | Age: 49
LOS: 0 days | Discharge: HOME | End: 2024-06-07
Attending: EMERGENCY MEDICINE
Payer: COMMERCIAL

## 2024-06-07 VITALS
SYSTOLIC BLOOD PRESSURE: 111 MMHG | HEART RATE: 85 BPM | DIASTOLIC BLOOD PRESSURE: 72 MMHG | OXYGEN SATURATION: 97 % | TEMPERATURE: 98 F | RESPIRATION RATE: 17 BRPM

## 2024-06-07 VITALS
DIASTOLIC BLOOD PRESSURE: 101 MMHG | SYSTOLIC BLOOD PRESSURE: 143 MMHG | OXYGEN SATURATION: 98 % | RESPIRATION RATE: 18 BRPM | HEIGHT: 70 IN | HEART RATE: 80 BPM | TEMPERATURE: 98 F | WEIGHT: 210.1 LBS

## 2024-06-07 DIAGNOSIS — F17.210 NICOTINE DEPENDENCE, CIGARETTES, UNCOMPLICATED: ICD-10-CM

## 2024-06-07 DIAGNOSIS — E78.5 HYPERLIPIDEMIA, UNSPECIFIED: ICD-10-CM

## 2024-06-07 DIAGNOSIS — R61 GENERALIZED HYPERHIDROSIS: ICD-10-CM

## 2024-06-07 DIAGNOSIS — H55.09 OTHER FORMS OF NYSTAGMUS: ICD-10-CM

## 2024-06-07 DIAGNOSIS — Z95.5 PRESENCE OF CORONARY ANGIOPLASTY IMPLANT AND GRAFT: ICD-10-CM

## 2024-06-07 DIAGNOSIS — R42 DIZZINESS AND GIDDINESS: ICD-10-CM

## 2024-06-07 DIAGNOSIS — I25.10 ATHEROSCLEROTIC HEART DISEASE OF NATIVE CORONARY ARTERY WITHOUT ANGINA PECTORIS: ICD-10-CM

## 2024-06-07 DIAGNOSIS — R11.2 NAUSEA WITH VOMITING, UNSPECIFIED: ICD-10-CM

## 2024-06-07 DIAGNOSIS — R11.10 VOMITING, UNSPECIFIED: ICD-10-CM

## 2024-06-07 DIAGNOSIS — I11.9 HYPERTENSIVE HEART DISEASE WITHOUT HEART FAILURE: ICD-10-CM

## 2024-06-07 LAB
ALBUMIN SERPL ELPH-MCNC: 5.1 G/DL — SIGNIFICANT CHANGE UP (ref 3.5–5.2)
ALP SERPL-CCNC: 112 U/L — SIGNIFICANT CHANGE UP (ref 30–115)
ALT FLD-CCNC: 19 U/L — SIGNIFICANT CHANGE UP (ref 0–41)
ANION GAP SERPL CALC-SCNC: 14 MMOL/L — SIGNIFICANT CHANGE UP (ref 7–14)
AST SERPL-CCNC: 18 U/L — SIGNIFICANT CHANGE UP (ref 0–41)
BASOPHILS # BLD AUTO: 0.05 K/UL — SIGNIFICANT CHANGE UP (ref 0–0.2)
BASOPHILS NFR BLD AUTO: 0.5 % — SIGNIFICANT CHANGE UP (ref 0–1)
BILIRUB SERPL-MCNC: 0.6 MG/DL — SIGNIFICANT CHANGE UP (ref 0.2–1.2)
BUN SERPL-MCNC: 21 MG/DL — HIGH (ref 10–20)
CALCIUM SERPL-MCNC: 10.2 MG/DL — SIGNIFICANT CHANGE UP (ref 8.4–10.5)
CHLORIDE SERPL-SCNC: 101 MMOL/L — SIGNIFICANT CHANGE UP (ref 98–110)
CO2 SERPL-SCNC: 24 MMOL/L — SIGNIFICANT CHANGE UP (ref 17–32)
CREAT SERPL-MCNC: 1.2 MG/DL — SIGNIFICANT CHANGE UP (ref 0.7–1.5)
EGFR: 74 ML/MIN/1.73M2 — SIGNIFICANT CHANGE UP
EOSINOPHIL # BLD AUTO: 0.1 K/UL — SIGNIFICANT CHANGE UP (ref 0–0.7)
EOSINOPHIL NFR BLD AUTO: 1 % — SIGNIFICANT CHANGE UP (ref 0–8)
GLUCOSE SERPL-MCNC: 132 MG/DL — HIGH (ref 70–99)
HCT VFR BLD CALC: 47 % — SIGNIFICANT CHANGE UP (ref 42–52)
HGB BLD-MCNC: 16 G/DL — SIGNIFICANT CHANGE UP (ref 14–18)
IMM GRANULOCYTES NFR BLD AUTO: 0.4 % — HIGH (ref 0.1–0.3)
LIDOCAIN IGE QN: 18 U/L — SIGNIFICANT CHANGE UP (ref 7–60)
LYMPHOCYTES # BLD AUTO: 1.08 K/UL — LOW (ref 1.2–3.4)
LYMPHOCYTES # BLD AUTO: 10.7 % — LOW (ref 20.5–51.1)
MCHC RBC-ENTMCNC: 29.1 PG — SIGNIFICANT CHANGE UP (ref 27–31)
MCHC RBC-ENTMCNC: 34 G/DL — SIGNIFICANT CHANGE UP (ref 32–37)
MCV RBC AUTO: 85.5 FL — SIGNIFICANT CHANGE UP (ref 80–94)
MONOCYTES # BLD AUTO: 0.41 K/UL — SIGNIFICANT CHANGE UP (ref 0.1–0.6)
MONOCYTES NFR BLD AUTO: 4.1 % — SIGNIFICANT CHANGE UP (ref 1.7–9.3)
NEUTROPHILS # BLD AUTO: 8.38 K/UL — HIGH (ref 1.4–6.5)
NEUTROPHILS NFR BLD AUTO: 83.3 % — HIGH (ref 42.2–75.2)
NRBC # BLD: 0 /100 WBCS — SIGNIFICANT CHANGE UP (ref 0–0)
PLATELET # BLD AUTO: 294 K/UL — SIGNIFICANT CHANGE UP (ref 130–400)
PMV BLD: 10.6 FL — HIGH (ref 7.4–10.4)
POTASSIUM SERPL-MCNC: 4.7 MMOL/L — SIGNIFICANT CHANGE UP (ref 3.5–5)
POTASSIUM SERPL-SCNC: 4.7 MMOL/L — SIGNIFICANT CHANGE UP (ref 3.5–5)
PROT SERPL-MCNC: 8.2 G/DL — HIGH (ref 6–8)
RBC # BLD: 5.5 M/UL — SIGNIFICANT CHANGE UP (ref 4.7–6.1)
RBC # FLD: 13.2 % — SIGNIFICANT CHANGE UP (ref 11.5–14.5)
SODIUM SERPL-SCNC: 139 MMOL/L — SIGNIFICANT CHANGE UP (ref 135–146)
WBC # BLD: 10.06 K/UL — SIGNIFICANT CHANGE UP (ref 4.8–10.8)
WBC # FLD AUTO: 10.06 K/UL — SIGNIFICANT CHANGE UP (ref 4.8–10.8)

## 2024-06-07 PROCEDURE — 80053 COMPREHEN METABOLIC PANEL: CPT

## 2024-06-07 PROCEDURE — 83690 ASSAY OF LIPASE: CPT

## 2024-06-07 PROCEDURE — 96375 TX/PRO/DX INJ NEW DRUG ADDON: CPT

## 2024-06-07 PROCEDURE — 82962 GLUCOSE BLOOD TEST: CPT

## 2024-06-07 PROCEDURE — 99284 EMERGENCY DEPT VISIT MOD MDM: CPT | Mod: 25

## 2024-06-07 PROCEDURE — 96374 THER/PROPH/DIAG INJ IV PUSH: CPT

## 2024-06-07 PROCEDURE — 93010 ELECTROCARDIOGRAM REPORT: CPT

## 2024-06-07 PROCEDURE — 93005 ELECTROCARDIOGRAM TRACING: CPT

## 2024-06-07 PROCEDURE — 36415 COLL VENOUS BLD VENIPUNCTURE: CPT

## 2024-06-07 PROCEDURE — 96361 HYDRATE IV INFUSION ADD-ON: CPT

## 2024-06-07 PROCEDURE — 99284 EMERGENCY DEPT VISIT MOD MDM: CPT

## 2024-06-07 PROCEDURE — 85025 COMPLETE CBC W/AUTO DIFF WBC: CPT

## 2024-06-07 RX ORDER — MECLIZINE HCL 12.5 MG
50 TABLET ORAL ONCE
Refills: 0 | Status: COMPLETED | OUTPATIENT
Start: 2024-06-07 | End: 2024-06-07

## 2024-06-07 RX ORDER — SODIUM CHLORIDE 9 MG/ML
1000 INJECTION INTRAMUSCULAR; INTRAVENOUS; SUBCUTANEOUS ONCE
Refills: 0 | Status: COMPLETED | OUTPATIENT
Start: 2024-06-07 | End: 2024-06-07

## 2024-06-07 RX ORDER — KETOROLAC TROMETHAMINE 30 MG/ML
15 SYRINGE (ML) INJECTION ONCE
Refills: 0 | Status: DISCONTINUED | OUTPATIENT
Start: 2024-06-07 | End: 2024-06-07

## 2024-06-07 RX ORDER — DIAZEPAM 5 MG
5 TABLET ORAL ONCE
Refills: 0 | Status: DISCONTINUED | OUTPATIENT
Start: 2024-06-07 | End: 2024-06-07

## 2024-06-07 RX ORDER — METOCLOPRAMIDE HCL 10 MG
10 TABLET ORAL ONCE
Refills: 0 | Status: COMPLETED | OUTPATIENT
Start: 2024-06-07 | End: 2024-06-07

## 2024-06-07 RX ORDER — MECLIZINE HCL 12.5 MG
1 TABLET ORAL
Qty: 14 | Refills: 0
Start: 2024-06-07 | End: 2024-06-13

## 2024-06-07 RX ADMIN — Medication 10 MILLIGRAM(S): at 11:59

## 2024-06-07 RX ADMIN — Medication 5 MILLIGRAM(S): at 14:26

## 2024-06-07 RX ADMIN — Medication 15 MILLIGRAM(S): at 15:10

## 2024-06-07 RX ADMIN — Medication 50 MILLIGRAM(S): at 11:59

## 2024-06-07 RX ADMIN — Medication 15 MILLIGRAM(S): at 14:27

## 2024-06-07 RX ADMIN — SODIUM CHLORIDE 1000 MILLILITER(S): 9 INJECTION INTRAMUSCULAR; INTRAVENOUS; SUBCUTANEOUS at 14:26

## 2024-06-07 RX ADMIN — SODIUM CHLORIDE 1000 MILLILITER(S): 9 INJECTION INTRAMUSCULAR; INTRAVENOUS; SUBCUTANEOUS at 15:36

## 2024-06-07 NOTE — ED PROVIDER NOTE - INTERPRETATION
ED EKG: my independent interpretation - Dr. Cecil Carr : Sinus rhythm at 77, normal axis, no ST elevations, flipped T's.  LVH.  Old EKG reviewed from 2018 November.  No significant changes.

## 2024-06-07 NOTE — ED PROVIDER NOTE - NSFOLLOWUPINSTRUCTIONS_ED_ALL_ED_FT
Please follow up with your primary care physician within 24-72 hours and return immediately if symptoms worsen.    Our Emergency Department Referral Coordinators will be reaching out to you in the next 24-48 hours from 9:00am to 5:00pm with a follow up appointment. Please expect a phone call from the hospital in that time frame. If you do not receive a call or if you have any questions or concerns, you can reach them at   [phone] [place] [address]    Vertigo  Vertigo is the feeling that you or your surroundings are moving when they are not. Vertigo can be dangerous if it occurs while you are doing something that could endanger you or others, such as driving.    What are the causes?  This condition is caused by a disturbance in the signals that are sent by your body’s sensory systems to your brain. Different causes of a disturbance can lead to vertigo, including:  Infections, especially in the inner ear.  A bad reaction to a drug, or misuse of alcohol and medicines.  Withdrawal from drugs or alcohol.  Quickly changing positions, as when lying down or rolling over in bed.  Migraine headaches.  Decreased blood flow to the brain.  Decreased blood pressure.  Increased pressure in the brain from a head or neck injury, stroke, infection, tumor, or bleeding.  Central nervous system disorders.  What are the signs or symptoms?  Symptoms of this condition usually occur when you move your head or your eyes in different directions. Symptoms may start suddenly, and they usually last for less than a minute. Symptoms may include:  Loss of balance and falling.  Feeling like you are spinning or moving.  Feeling like your surroundings are spinning or moving.  Nausea and vomiting.  Blurred vision or double vision.  Difficulty hearing.  Slurred speech.  Dizziness.  Involuntary eye movement (nystagmus).  Symptoms can be mild and cause only slight annoyance, or they can be severe and interfere with daily life. Episodes of vertigo may return (recur) over time, and they are often triggered by certain movements. Symptoms may improve over time.    How is this diagnosed?  This condition may be diagnosed based on medical history and the quality of your nystagmus. Your health care provider may test your eye movements by asking you to quickly change positions to trigger the nystagmus. This may be called the [place]-Hallpike test, head thrust test, or roll test. You may be referred to a health care provider who specializes in ear, nose, and throat (ENT) problems (otolaryngologist) or a provider who specializes in disorders of the central nervous system (neurologist).    You may have additional testing, including:  A physical exam.  Blood tests.  MRI.  A CT scan.  An electrocardiogram (ECG). This records electrical activity in your heart.  An electroencephalogram (EEG). This records electrical activity in your brain.  Hearing tests.  How is this treated?  Treatment for this condition depends on the cause and the severity of the symptoms. Treatment options include:  Medicines to treat nausea or vertigo. These are usually used for severe cases. Some medicines that are used to treat other conditions may also reduce or eliminate vertigo symptoms. These include:  Medicines that control allergies (antihistamines).  Medicines that control seizures (anticonvulsants).  Medicines that relieve depression (antidepressants).  Medicines that relieve anxiety (sedatives).  Head movements to adjust your inner ear back to normal. If your vertigo is caused by an ear problem, your health care provider may recommend certain movements to correct the problem.  Surgery. This is rare.  Follow these instructions at home:  Safety     Move slowly.Avoid sudden body or head movements.  Avoid driving.  Avoid operating heavy machinery.  Avoid doing any tasks that would cause danger to you or others if you would have a vertigo episode during the task.  If you have trouble walking or keeping your balance, try using a cane for stability. If you feel dizzy or unstable, sit down right away.  Return to your normal activities as told by your health care provider. Ask your health care provider what activities are safe for you.  General instructions     Take over-the-counter and prescription medicines only as told by your health care provider.  Avoid certain positions or movements as told by your health care provider.  Drink enough fluid to keep your urine clear or pale yellow.  Keep all follow-up visits as told by your health care provider. This is important.  Contact a health care provider if:  Your medicines do not relieve your vertigo or they make it worse.  You have a fever.  Your condition gets worse or you develop new symptoms.  Your family or friends notice any behavioral changes.  Your nausea or vomiting gets worse.  You have numbness or a “pins and needles” sensation in part of your body.  Get help right away if:  You have difficulty moving or speaking.  You are always dizzy.  You faint.  You develop severe headaches.  You have weakness in your hands, arms, or legs.  You have changes in your hearing or vision.  You develop a stiff neck.  You develop sensitivity to light.  This information is not intended to replace advice given to you by your health care provider. Make sure you discuss any questions you have with your health care provider.

## 2024-06-07 NOTE — ED ADULT NURSE NOTE - NSICDXFAMILYHX_GEN_ALL_CORE_FT
FAMILY HISTORY:  Mother  Still living? Unknown  Family history of hypertension, Age at diagnosis: Age Unknown  Family history of seizures, Age at diagnosis: Age Unknown    Aunt  Still living? Unknown  Family history of hypertension, Age at diagnosis: Age Unknown

## 2024-06-07 NOTE — ED ADULT NURSE NOTE - NSFALLHARMRISKINTERV_ED_ALL_ED
Assistance OOB with selected safe patient handling equipment if applicable/Assistance with ambulation/Communicate risk of Fall with Harm to all staff, patient, and family/Encourage patient to sit up slowly, dangle for a short time, stand at bedside before walking/Monitor gait and stability/Orthostatic vital signs/Provide visual cue: red socks, yellow wristband, yellow gown, etc/Reinforce activity limits and safety measures with patient and family/Bed in lowest position, wheels locked, appropriate side rails in place/Call bell, personal items and telephone in reach/Instruct patient to call for assistance before getting out of bed/chair/stretcher/Non-slip footwear applied when patient is off stretcher/[place] to call system/Physically safe environment - no spills, clutter or unnecessary equipment/Purposeful Proactive Rounding/Room/bathroom lighting operational, light cord in reach

## 2024-06-07 NOTE — ED PROVIDER NOTE - PATIENT PORTAL LINK FT
You can access the FollowMyHealth Patient Portal offered by [facility] by registering at the following website: [email or web address]. By joining [employer]’s FollowMyHealth portal, you will also be able to view your health information using other applications (apps) compatible with our system.

## 2024-06-07 NOTE — ED PROVIDER NOTE - CLINICAL SUMMARY MEDICAL DECISION MAKING FREE TEXT BOX
Peripheral vertigo, medicated, EKG reviewed, no indications for head CT at this time.  Improved.  Labs reviewed.  Reassess.  DC with follow-up.

## 2024-06-07 NOTE — ED PROVIDER NOTE - PROGRESS NOTE DETAILS
Authored by Dr. Carr: Patient reexamined.  Feels much better.  Trial of ambulation likely discharge.

## 2024-06-07 NOTE — ED PROVIDER NOTE - OBJECTIVE STATEMENT
48 yo male with a pmh of htn, hld, cad presents c/o intermittent dizziness that started yesterday. pt describes the dizziness as a room spinning sensation and notes worsening with positional changes. pt experienced 2 episode of NBNB emesis with his dizziness. pt denies any other symptoms including fevers, chill, headache, recent illness/travel, cough, abdominal pain, chest pain, or SOB.

## 2024-06-07 NOTE — ED ADULT NURSE NOTE - NS PRO AD NO ADVANCE DIRECTIVE
Quality 130: Documentation Of Current Medications In The Medical Record: Documentation of a medical reason(s) for not documenting, updating, or reviewing the patientâs current medications list (e.g., patient is in an urgent or emergent medical situation) Additional Notes: patient states no medications are taken Quality 226: Preventive Care And Screening: Tobacco Use: Screening And Cessation Intervention: Patient screened for tobacco use and is an ex/non-smoker Detail Level: Generalized No

## 2024-06-07 NOTE — ED ADULT NURSE NOTE - CHIEF COMPLAINT QUOTE
PT presents to the ED c/o of dizziness with vomiting starting yesterday at 10am. PT states he started feeling better yesterday when he went to bed. Pt then woke up at 7am with the same dizziness again and vomiting. Pt states the room is spinning. FS-161. NIH-0 in triage. Denies numbness or tingling. No slurred speech

## 2024-06-07 NOTE — ED ADULT TRIAGE NOTE - CHIEF COMPLAINT QUOTE
PT presents to the ED c/o of dizziness with vomiting starting yesterday at 10am. PT states he started feeling better yesterday when he went to bed. Pt then woke up at 7am with the same dizziness again and vomiting. Pt states the room is spinning. FS-161 PT presents to the ED c/o of dizziness with vomiting starting yesterday at 10am. PT states he started feeling better yesterday when he went to bed. Pt then woke up at 7am with the same dizziness again and vomiting. Pt states the room is spinning. FS-161. NIH-0 in triage. Denies numbness or tingling. No slurred speech

## 2024-06-07 NOTE — ED PROVIDER NOTE - PHYSICAL EXAMINATION
Gen: NAD, AOx3  Head: NCAT  HEENT: PERRL, oral mucosa moist, normal conjunctiva, oropharynx clear without exudate or erythema  Lung: CTAB, no respiratory distress, no wheezing, rales, rhonchi  CV: normal s1/s2, rrr, Normal perfusion, pulses 2+ throughout  Abd: soft, NTND, no CVA tenderness  Genitourinary: no pelvic tenderness  MSK: No edema, no visible deformities, full range of motion in all 4 extremities  Neuro: L nystagmus, CN II-XII grossly intact, No focal neurologic deficits, no meningeal signs, no pronator drift, coordination/sensation intact, strength 5/5 BUE/BLE, steady gait   Skin: No rash   Psych: normal affect

## 2024-06-07 NOTE — ED ADULT NURSE NOTE - HIV OFFER
Sleep Medicine referral faxed to On license of [facility] [address] ([phone]). This will be reviewed and they will contact patient to schedule her appointment.
Opt out

## 2024-06-07 NOTE — ED PROVIDER NOTE - ATTENDING APP SHARED VISIT CONTRIBUTION OF CARE
49-year-old male history of CAD status post stents, hypertension, hyperlipidemia, now presents with vertigo x 1 day, had an episode of vertigo with vomiting and diaphoresis yesterday at work while being inside.  Went home and everything went well.  No issues at night.  Woke up this morning and by moving his head developed the same symptoms.  Spinning.  Better with eyes closed.  Associated with nausea and vomiting.  No headache no weakness no chest pain or shortness of breath no back pain no diaphoresis today.    vss, nontoxic well-appearing, PERRL, EOMI, no photophobia, left-sided nystagmus horizontal, pink conj, anicteric, MMM, tongue midline, neck supple, CTAB, RRR, equal radial pulses bilat, abd soft/nt/nd, no cva tend. no calves tend, no edema, no fnd.  Negative pronator drift, no cerebellar signs, equal strength bilaterally, no rashes.

## 2024-06-20 ENCOUNTER — APPOINTMENT (OUTPATIENT)
Dept: OTOLARYNGOLOGY | Facility: CLINIC | Age: 49
End: 2024-06-20
Payer: COMMERCIAL

## 2024-06-20 VITALS — HEIGHT: 70 IN | BODY MASS INDEX: 27.2 KG/M2 | WEIGHT: 190 LBS

## 2024-06-20 DIAGNOSIS — R42 DIZZINESS AND GIDDINESS: ICD-10-CM

## 2024-06-20 PROCEDURE — 99203 OFFICE O/P NEW LOW 30 MIN: CPT

## 2024-06-25 PROBLEM — R42 VERTIGO: Status: ACTIVE | Noted: 2024-06-21

## 2024-06-25 NOTE — HISTORY OF PRESENT ILLNESS
[de-identified] : Patient presents today with c/o dizziness. Admits about 10 days ago had severe room spinning. Felt lightheaded, sweating. Seen in the ER. Happened in the morning and at night. He admits felt drunk. Denies any tinnitus at the time. He admits a headaches/ pressure feeling in the back of his head when this happened. No previous h/o dizziness.

## 2024-06-25 NOTE — ASSESSMENT
[FreeTextEntry1] : Patient had 1 episode of vertigo that has since resolved.  He denies any otologic symptoms and otologic exam normal.   and VNG testing offered, but patient declined as his hearing is subjectively baseline and he no longer has vertigo.  Follow up PRN.

## 2024-06-25 NOTE — PHYSICAL EXAM
[Normal] : mucosa is normal [Midline] : trachea located in midline position [] : [place]-Hallpike test is negative [de-identified] : Negative bilaterally [de-identified] : Head thrust negative bilaterally.

## 2024-08-06 ENCOUNTER — APPOINTMENT (OUTPATIENT)
Dept: CARDIOLOGY | Facility: CLINIC | Age: 49
End: 2024-08-06

## 2024-09-20 ENCOUNTER — RX RENEWAL (OUTPATIENT)
Age: 49
End: 2024-09-20

## 2025-02-04 ENCOUNTER — NON-APPOINTMENT (OUTPATIENT)
Age: 50
End: 2025-02-04

## 2025-02-04 ENCOUNTER — APPOINTMENT (OUTPATIENT)
Dept: CARDIOLOGY | Facility: CLINIC | Age: 50
End: 2025-02-04
Payer: COMMERCIAL

## 2025-02-04 VITALS
HEIGHT: 70 IN | TEMPERATURE: 97.2 F | SYSTOLIC BLOOD PRESSURE: 120 MMHG | RESPIRATION RATE: 14 BRPM | OXYGEN SATURATION: 99 % | DIASTOLIC BLOOD PRESSURE: 80 MMHG | HEART RATE: 67 BPM | BODY MASS INDEX: 27.49 KG/M2 | WEIGHT: 192 LBS

## 2025-02-04 DIAGNOSIS — I10 ESSENTIAL (PRIMARY) HYPERTENSION: ICD-10-CM

## 2025-02-04 DIAGNOSIS — I25.10 ATHEROSCLEROTIC HEART DISEASE OF NATIVE CORONARY ARTERY W/OUT ANGINA PECTORIS: ICD-10-CM

## 2025-02-04 DIAGNOSIS — R73.02 IMPAIRED GLUCOSE TOLERANCE (ORAL): ICD-10-CM

## 2025-02-04 DIAGNOSIS — E78.5 HYPERLIPIDEMIA, UNSPECIFIED: ICD-10-CM

## 2025-02-04 PROCEDURE — 99213 OFFICE O/P EST LOW 20 MIN: CPT | Mod: 25

## 2025-02-04 PROCEDURE — 93000 ELECTROCARDIOGRAM COMPLETE: CPT

## 2025-07-16 ENCOUNTER — EMERGENCY (EMERGENCY)
Facility: HOSPITAL | Age: 50
LOS: 0 days | Discharge: HOME | End: 2025-07-16
Attending: EMERGENCY MEDICINE
Payer: COMMERCIAL

## 2025-07-16 VITALS
DIASTOLIC BLOOD PRESSURE: 101 MMHG | TEMPERATURE: 98 F | RESPIRATION RATE: 18 BRPM | HEART RATE: 80 BPM | SYSTOLIC BLOOD PRESSURE: 144 MMHG | OXYGEN SATURATION: 96 % | WEIGHT: 192.02 LBS

## 2025-07-16 PROCEDURE — 99283 EMERGENCY DEPT VISIT LOW MDM: CPT

## 2025-07-16 RX ORDER — PREDNISONE 20 MG/1
4 TABLET ORAL
Qty: 35 | Refills: 0
Start: 2025-07-16 | End: 2025-07-30

## 2025-07-16 NOTE — ED PROVIDER NOTE - PHYSICAL EXAMINATION
VITAL SIGNS: I have reviewed nursing notes and confirm.  CONSTITUTIONAL: Well-developed; well-nourished; in no acute distress.  SKIN: has multiple patches consistent with contact dermatitis in multiple stages of healing. no overlying cellulitis, drainage   HEAD: Normocephalic; atraumatic.  EYES: PERRL, EOM intact; conjunctiva and sclera clear.  ENT: No nasal discharge; airway clear  CARD: S1, S2 normal; no murmurs, gallops, or rubs. Regular rate and rhythm.  RESP: No wheezes, rales or rhonchi.  ABD: Normal bowel sounds; soft; non-distended; non-tender  EXT: Normal ROM.  NEURO: Alert, oriented. Grossly unremarkable. No focal deficits.  PSYCH: Cooperative, appropriate.

## 2025-07-16 NOTE — ED PROVIDER NOTE - OBJECTIVE STATEMENT
51 yo M, hx of HTN, CAD sp PCI, here for assessment of rash -- patient has a history of severe reaction to poison ivy, was exposed about 4 days ago, has been trying to use topical treatments but notes rash is progressing and itching has worsened. No oral, ocular or genital involvement. No dyspnea, vomiting.

## 2025-07-16 NOTE — ED PROVIDER NOTE - NSFOLLOWUPINSTRUCTIONS_ED_ALL_ED_FT
Poison Ivy Dermatitis  Poison ivy dermatitis is irritation and swelling (inflammation) of the skin caused by chemicals in the leaves of the poison ivy plant. The skin reaction often involves redness, blisters, and extreme itching.    What are the causes?  This condition is caused by a chemical (urushiol) found in the sap of the poison ivy plant. This chemical is sticky and can easily spread to people, animals, and objects. You can get poison ivy dermatitis by:  Having direct contact with a poison ivy plant.  Touching animals, other people, or objects that have come in contact with poison ivy and have the chemical on them.  What increases the risk?  This condition is more likely to develop in people who:  Are outdoors often in wooded or marshy areas.  Go outdoors without wearing protective clothing, such as closed shoes, long pants, and a long-sleeved shirt.  What are the signs or symptoms?  A hand with red patches and a close-up of white blisters.  Symptoms of this condition include:  Redness of the skin.  Extreme itching.  A rash that often includes bumps and blisters. The rash usually appears 48 hours after exposure, if you have been exposed before. If this is the first time you have been exposed, the rash may not appear until a week after exposure.  Swelling. This may occur if the reaction is more severe.  Symptoms usually last for 1–2 weeks. However, the first time you develop this condition, symptoms may last 3–4 weeks.    How is this diagnosed?  This condition may be diagnosed based on your symptoms and a physical exam. Your health care provider may also ask you about any recent outdoor activity.    How is this treated?  Treatment for this condition will vary depending on how severe it is. Treatment may include:  Hydrocortisone cream or calamine lotion to relieve itching.  Oatmeal baths to soothe the skin.  Medicines, such as over-the-counter antihistamine tablets.  Oral or injected steroid medicine, for more severe reactions.  Follow these instructions at home:  Medicines    Take or apply over-the-counter and prescription medicines only as told by your health care provider.  Use hydrocortisone cream or calamine lotion as needed to soothe the skin and relieve itching.  General instructions    Do not scratch or rub your skin.  Apply a cold, wet cloth (cold compress) to the affected areas or take baths in cool water. This will help with itching. Avoid hot baths and showers.  Take oatmeal baths as needed. Use colloidal oatmeal. You can get this at your local pharmacy or grocery store. Follow the instructions on the packaging.  Wash all clothes, bedsheets, towels, and blankets you were in contact with between your exposure and appearance of the rash.  Check the affected area every day for signs of infection. Check for:  More redness, swelling, or pain.  Fluid or blood.  Warmth.  Pus or a bad smell.  Keep all follow-up visits. Your health care provider may want to see how your skin is progressing with treatment.  How is this prevented?  Poison ivy leaves.  Learn to identify the poison ivy plant and avoid contact with the plant. This plant can be recognized by the number of leaves. Generally, poison ivy has three leaves with flowering branches on a single stem. The leaves are typically glossy, and they have jagged edges that come to a point.  If you have been exposed to poison ivy, thoroughly wash with soap and water right away. You have about 30 minutes to remove the plant resin before it will cause the rash. Be sure to wash under your fingernails, because any plant resin there will continue to spread the rash.  When hiking or camping, wear clothes that will help you to avoid skin exposure. This includes long pants, a long-sleeved shirt, long socks, and hiking boots. You can also apply preventive lotion to your skin to help limit exposure.  If you suspect that your clothes or outdoor gear came in contact with poison ivy, rinse them off outside with a garden hose before you bring them inside your house.  When doing yard work or gardening, wear gloves, long sleeves, long pants, and boots. Wash your garden tools and gloves if they come in contact with poison ivy.  If you suspect that your pet has come into contact with poison ivy, wash them with pet shampoo and water. Make sure to wear gloves while washing your pet.  Contact a health care provider if:  You have open sores in the rash area.  You have any signs of infection.  You have redness that spreads beyond the rash area.  You have a fever.  You have a rash over a large area of your body.  You have a rash on your eyes, mouth, or genitals.  You have a rash that does not improve after a few weeks.  Get help right away if:  Your face swells or your eyes swell shut.  You have trouble breathing.  You have trouble swallowing.  These symptoms may be an emergency. Get help right away. Call 911.  Do not wait to see if the symptoms will go away.  Do not drive yourself to the hospital.  This information is not intended to replace advice given to you by your health care provider. Make sure you discuss any questions you have with your health care provider.

## 2025-07-16 NOTE — ED ADULT TRIAGE NOTE - GLASGOW COMA SCALE: BEST VERBAL RESPONSE, MLM
-- DO NOT REPLY / DO NOT REPLY ALL --  -- Message is from [employer] ([employer]) --    General Patient Message: [employer] called again to follow up on the fax that they sent.They have been having issues with their phones. They asked that we use an alternate fax number. [phone]      Caller Information       Type Contact Phone/Fax    10/05/2023 12:47 PM CDT Phone (Incoming) [employer] [phone]        Alternative phone number: No    Can a detailed message be left? Yes     Message Turnaround:     Is it Working Hours? Yes - Working Hours     [place]:    Please give this turnaround time to the caller:   \"This message will be sent to [state Provider's name]. The clinical team will fulfill your request as soon as they review your message.\"                 (V5) oriented

## 2025-07-16 NOTE — ED PROVIDER NOTE - CLINICAL SUMMARY MEDICAL DECISION MAKING FREE TEXT BOX
patient with large area exposure poison ivy -- no signs of secondary infection, no mucosal involvement, no anaphylaxis. Will dc with steroid taper, zyrtec, follow up, infection and return precautions.

## 2025-07-16 NOTE — ED PROVIDER NOTE - PATIENT PORTAL LINK FT
You can access the FollowMyHealth Patient Portal offered by [facility] by registering at the following website: [email or web address]. By joining [employer]’s FollowMyHealth portal, you will also be able to view your health information using other applications (apps) compatible with our system.

## 2025-07-18 DIAGNOSIS — I25.10 ATHEROSCLEROTIC HEART DISEASE OF NATIVE CORONARY ARTERY WITHOUT ANGINA PECTORIS: ICD-10-CM

## 2025-07-18 DIAGNOSIS — Z95.5 PRESENCE OF CORONARY ANGIOPLASTY IMPLANT AND GRAFT: ICD-10-CM

## 2025-07-18 DIAGNOSIS — I10 ESSENTIAL (PRIMARY) HYPERTENSION: ICD-10-CM

## 2025-07-18 DIAGNOSIS — R21 RASH AND OTHER NONSPECIFIC SKIN ERUPTION: ICD-10-CM

## 2025-07-18 DIAGNOSIS — L23.7 ALLERGIC CONTACT DERMATITIS DUE TO PLANTS, EXCEPT FOOD: ICD-10-CM

## 2025-09-02 ENCOUNTER — APPOINTMENT (OUTPATIENT)
Dept: CARDIOLOGY | Facility: CLINIC | Age: 50
End: 2025-09-02
Payer: COMMERCIAL

## 2025-09-02 DIAGNOSIS — I10 ESSENTIAL (PRIMARY) HYPERTENSION: ICD-10-CM

## 2025-09-02 DIAGNOSIS — E78.5 HYPERLIPIDEMIA, UNSPECIFIED: ICD-10-CM

## 2025-09-02 DIAGNOSIS — I25.10 ATHEROSCLEROTIC HEART DISEASE OF NATIVE CORONARY ARTERY W/OUT ANGINA PECTORIS: ICD-10-CM

## 2025-09-02 DIAGNOSIS — R73.02 IMPAIRED GLUCOSE TOLERANCE (ORAL): ICD-10-CM

## 2025-09-02 PROCEDURE — 93351 STRESS TTE COMPLETE: CPT

## 2025-09-02 PROCEDURE — 93325 DOPPLER ECHO COLOR FLOW MAPG: CPT

## 2025-09-02 PROCEDURE — 93320 DOPPLER ECHO COMPLETE: CPT
